# Patient Record
Sex: FEMALE | Race: WHITE | NOT HISPANIC OR LATINO | Employment: OTHER | URBAN - METROPOLITAN AREA
[De-identification: names, ages, dates, MRNs, and addresses within clinical notes are randomized per-mention and may not be internally consistent; named-entity substitution may affect disease eponyms.]

---

## 2017-09-26 ENCOUNTER — APPOINTMENT (OUTPATIENT)
Dept: PHYSICAL THERAPY | Facility: CLINIC | Age: 82
End: 2017-09-26
Payer: MEDICARE

## 2017-09-26 PROCEDURE — G8990 OTHER PT/OT CURRENT STATUS: HCPCS

## 2017-09-26 PROCEDURE — 97162 PT EVAL MOD COMPLEX 30 MIN: CPT

## 2017-09-26 PROCEDURE — G8991 OTHER PT/OT GOAL STATUS: HCPCS

## 2017-09-26 PROCEDURE — 97535 SELF CARE MNGMENT TRAINING: CPT

## 2017-09-27 ENCOUNTER — APPOINTMENT (OUTPATIENT)
Dept: PHYSICAL THERAPY | Facility: CLINIC | Age: 82
End: 2017-09-27
Payer: MEDICARE

## 2017-09-27 PROCEDURE — 97140 MANUAL THERAPY 1/> REGIONS: CPT

## 2017-09-27 PROCEDURE — 97110 THERAPEUTIC EXERCISES: CPT

## 2017-09-29 ENCOUNTER — APPOINTMENT (OUTPATIENT)
Dept: PHYSICAL THERAPY | Facility: CLINIC | Age: 82
End: 2017-09-29
Payer: MEDICARE

## 2017-09-29 PROCEDURE — 97140 MANUAL THERAPY 1/> REGIONS: CPT

## 2017-09-29 PROCEDURE — 97110 THERAPEUTIC EXERCISES: CPT

## 2017-10-02 ENCOUNTER — APPOINTMENT (OUTPATIENT)
Dept: PHYSICAL THERAPY | Facility: CLINIC | Age: 82
End: 2017-10-02
Payer: MEDICARE

## 2017-10-02 PROCEDURE — 97110 THERAPEUTIC EXERCISES: CPT

## 2017-10-02 PROCEDURE — 97140 MANUAL THERAPY 1/> REGIONS: CPT

## 2017-10-04 ENCOUNTER — APPOINTMENT (OUTPATIENT)
Dept: PHYSICAL THERAPY | Facility: CLINIC | Age: 82
End: 2017-10-04
Payer: MEDICARE

## 2017-10-04 PROCEDURE — 97110 THERAPEUTIC EXERCISES: CPT

## 2017-10-04 PROCEDURE — 97140 MANUAL THERAPY 1/> REGIONS: CPT

## 2017-10-06 ENCOUNTER — APPOINTMENT (OUTPATIENT)
Dept: PHYSICAL THERAPY | Facility: CLINIC | Age: 82
End: 2017-10-06
Payer: MEDICARE

## 2017-10-06 PROCEDURE — 97110 THERAPEUTIC EXERCISES: CPT

## 2017-10-06 PROCEDURE — 97140 MANUAL THERAPY 1/> REGIONS: CPT

## 2017-10-09 ENCOUNTER — APPOINTMENT (OUTPATIENT)
Dept: PHYSICAL THERAPY | Facility: CLINIC | Age: 82
End: 2017-10-09
Payer: MEDICARE

## 2017-10-09 PROCEDURE — 97110 THERAPEUTIC EXERCISES: CPT

## 2017-10-09 PROCEDURE — 97140 MANUAL THERAPY 1/> REGIONS: CPT

## 2017-10-11 ENCOUNTER — APPOINTMENT (OUTPATIENT)
Dept: PHYSICAL THERAPY | Facility: CLINIC | Age: 82
End: 2017-10-11
Payer: MEDICARE

## 2017-10-11 PROCEDURE — 97140 MANUAL THERAPY 1/> REGIONS: CPT

## 2017-10-11 PROCEDURE — 97110 THERAPEUTIC EXERCISES: CPT

## 2017-10-13 ENCOUNTER — APPOINTMENT (OUTPATIENT)
Dept: PHYSICAL THERAPY | Facility: CLINIC | Age: 82
End: 2017-10-13
Payer: MEDICARE

## 2017-10-13 PROCEDURE — 97140 MANUAL THERAPY 1/> REGIONS: CPT

## 2017-10-13 PROCEDURE — 97110 THERAPEUTIC EXERCISES: CPT

## 2017-10-16 ENCOUNTER — APPOINTMENT (OUTPATIENT)
Dept: PHYSICAL THERAPY | Facility: CLINIC | Age: 82
End: 2017-10-16
Payer: MEDICARE

## 2017-10-16 PROCEDURE — 97140 MANUAL THERAPY 1/> REGIONS: CPT

## 2017-10-16 PROCEDURE — G8991 OTHER PT/OT GOAL STATUS: HCPCS

## 2017-10-16 PROCEDURE — G8990 OTHER PT/OT CURRENT STATUS: HCPCS

## 2017-10-16 PROCEDURE — 97110 THERAPEUTIC EXERCISES: CPT

## 2017-10-16 PROCEDURE — 97164 PT RE-EVAL EST PLAN CARE: CPT

## 2017-10-18 ENCOUNTER — APPOINTMENT (OUTPATIENT)
Dept: PHYSICAL THERAPY | Facility: CLINIC | Age: 82
End: 2017-10-18
Payer: MEDICARE

## 2017-10-18 PROCEDURE — 97140 MANUAL THERAPY 1/> REGIONS: CPT

## 2017-10-18 PROCEDURE — 97150 GROUP THERAPEUTIC PROCEDURES: CPT

## 2017-10-20 ENCOUNTER — APPOINTMENT (OUTPATIENT)
Dept: PHYSICAL THERAPY | Facility: CLINIC | Age: 82
End: 2017-10-20
Payer: MEDICARE

## 2017-10-20 PROCEDURE — 97140 MANUAL THERAPY 1/> REGIONS: CPT

## 2017-10-20 PROCEDURE — 97150 GROUP THERAPEUTIC PROCEDURES: CPT

## 2017-10-23 ENCOUNTER — APPOINTMENT (OUTPATIENT)
Dept: PHYSICAL THERAPY | Facility: CLINIC | Age: 82
End: 2017-10-23
Payer: MEDICARE

## 2017-10-23 PROCEDURE — 97140 MANUAL THERAPY 1/> REGIONS: CPT

## 2017-10-23 PROCEDURE — 97150 GROUP THERAPEUTIC PROCEDURES: CPT

## 2017-10-25 ENCOUNTER — APPOINTMENT (OUTPATIENT)
Dept: PHYSICAL THERAPY | Facility: CLINIC | Age: 82
End: 2017-10-25
Payer: MEDICARE

## 2017-10-25 PROCEDURE — 97110 THERAPEUTIC EXERCISES: CPT

## 2017-10-25 PROCEDURE — 97140 MANUAL THERAPY 1/> REGIONS: CPT

## 2017-10-27 ENCOUNTER — APPOINTMENT (OUTPATIENT)
Dept: PHYSICAL THERAPY | Facility: CLINIC | Age: 82
End: 2017-10-27
Payer: MEDICARE

## 2017-10-27 PROCEDURE — 97140 MANUAL THERAPY 1/> REGIONS: CPT

## 2017-10-27 PROCEDURE — 97110 THERAPEUTIC EXERCISES: CPT

## 2017-10-30 ENCOUNTER — APPOINTMENT (OUTPATIENT)
Dept: PHYSICAL THERAPY | Facility: CLINIC | Age: 82
End: 2017-10-30
Payer: MEDICARE

## 2017-10-30 PROCEDURE — 97110 THERAPEUTIC EXERCISES: CPT

## 2017-10-30 PROCEDURE — 97140 MANUAL THERAPY 1/> REGIONS: CPT

## 2017-11-01 ENCOUNTER — APPOINTMENT (OUTPATIENT)
Dept: PHYSICAL THERAPY | Facility: CLINIC | Age: 82
End: 2017-11-01
Payer: MEDICARE

## 2017-11-01 PROCEDURE — 97150 GROUP THERAPEUTIC PROCEDURES: CPT

## 2017-11-01 PROCEDURE — 97140 MANUAL THERAPY 1/> REGIONS: CPT

## 2017-11-03 ENCOUNTER — APPOINTMENT (OUTPATIENT)
Dept: PHYSICAL THERAPY | Facility: CLINIC | Age: 82
End: 2017-11-03
Payer: MEDICARE

## 2017-11-03 PROCEDURE — 97140 MANUAL THERAPY 1/> REGIONS: CPT

## 2017-11-03 PROCEDURE — 97150 GROUP THERAPEUTIC PROCEDURES: CPT

## 2017-11-06 ENCOUNTER — APPOINTMENT (OUTPATIENT)
Dept: PHYSICAL THERAPY | Facility: CLINIC | Age: 82
End: 2017-11-06
Payer: MEDICARE

## 2017-11-06 PROCEDURE — 97110 THERAPEUTIC EXERCISES: CPT

## 2017-11-06 PROCEDURE — 97140 MANUAL THERAPY 1/> REGIONS: CPT

## 2017-11-08 ENCOUNTER — APPOINTMENT (OUTPATIENT)
Dept: PHYSICAL THERAPY | Facility: CLINIC | Age: 82
End: 2017-11-08
Payer: MEDICARE

## 2017-11-08 PROCEDURE — 97150 GROUP THERAPEUTIC PROCEDURES: CPT

## 2017-11-08 PROCEDURE — 97140 MANUAL THERAPY 1/> REGIONS: CPT

## 2017-11-13 ENCOUNTER — APPOINTMENT (OUTPATIENT)
Dept: PHYSICAL THERAPY | Facility: CLINIC | Age: 82
End: 2017-11-13
Payer: MEDICARE

## 2017-11-15 ENCOUNTER — APPOINTMENT (OUTPATIENT)
Dept: PHYSICAL THERAPY | Facility: CLINIC | Age: 82
End: 2017-11-15
Payer: MEDICARE

## 2017-11-15 PROCEDURE — 97164 PT RE-EVAL EST PLAN CARE: CPT

## 2017-11-15 PROCEDURE — 97140 MANUAL THERAPY 1/> REGIONS: CPT

## 2017-11-15 PROCEDURE — G8990 OTHER PT/OT CURRENT STATUS: HCPCS

## 2017-11-15 PROCEDURE — 97110 THERAPEUTIC EXERCISES: CPT

## 2017-11-15 PROCEDURE — G8991 OTHER PT/OT GOAL STATUS: HCPCS

## 2017-11-17 ENCOUNTER — APPOINTMENT (OUTPATIENT)
Dept: PHYSICAL THERAPY | Facility: CLINIC | Age: 82
End: 2017-11-17
Payer: MEDICARE

## 2018-08-28 ENCOUNTER — TRANSCRIBE ORDERS (OUTPATIENT)
Dept: PHYSICAL THERAPY | Facility: CLINIC | Age: 83
End: 2018-08-28

## 2018-08-28 ENCOUNTER — EVALUATION (OUTPATIENT)
Dept: OCCUPATIONAL THERAPY | Facility: CLINIC | Age: 83
End: 2018-08-28
Payer: MEDICARE

## 2018-08-28 DIAGNOSIS — S62.616D CLOSED DISPLACED FRACTURE OF PROXIMAL PHALANX OF RIGHT LITTLE FINGER WITH ROUTINE HEALING, SUBSEQUENT ENCOUNTER: Primary | ICD-10-CM

## 2018-08-28 PROCEDURE — 97166 OT EVAL MOD COMPLEX 45 MIN: CPT

## 2018-08-28 PROCEDURE — 97535 SELF CARE MNGMENT TRAINING: CPT

## 2018-08-28 PROCEDURE — 97110 THERAPEUTIC EXERCISES: CPT

## 2018-08-28 PROCEDURE — G8984 CARRY CURRENT STATUS: HCPCS

## 2018-08-28 PROCEDURE — 97140 MANUAL THERAPY 1/> REGIONS: CPT

## 2018-08-28 PROCEDURE — 97018 PARAFFIN BATH THERAPY: CPT

## 2018-08-28 PROCEDURE — G8985 CARRY GOAL STATUS: HCPCS

## 2018-08-28 NOTE — PROGRESS NOTES
OT Evaluation     Today's date: 2018  Patient name: Jojo Ellis  : 1935  MRN: [id number]  Referring provider: Racquel Walters MD  Dx:   Encounter Diagnosis     ICD-10-CM    1  Closed displaced fracture of proximal phalanx of right little finger with routine healing, subsequent encounter S62 616D            Assessment    Assessment details: Patient presenting to OP OT services with a closed nondisplaced fracture of proximal phalanx of right small finger  Patient reports experiencing a LOC with a fall on 2018  Patient was evaluated at [facility] the following day on 2018  X-Ray completed with fracture of right small finger noted  Patient also had X-Rays completed of head and neck  Patient also presenting with black and blueness of left elbow  Patient had pinning completed on 2018 by Dr Roddy Moritz to realign right small finger  Patient had pins removed on 2018  Patient has a follow-up on 2018  Patient presents with ulnar gutter splint placed since surgery  Understanding of Dx/Px/POC: good   Prognosis: good    Goals  STGs    Pt will increase  strength by 5-10#  Pt will increase digit ROM by 50%  Pt will demonstrate decrease in edema by 25%    Independent with HEP      LTGs     Pt will increase  strength by an additional 5-10#      Pt will increase digit ROM to WNL    Pt will demonstrate decrease in edema by 50%    Pt will report decrease in morning stiffness by 50%    Pt will report an increase in ADL/IADL participation          Plan  Patient would benefit from: OT eval and skilled occupational therapy  Planned modality interventions: unattended electrical stimulation, ultrasound, thermotherapy: paraffin bath, thermotherapy: hydrocollator packs and cryotherapy  Planned therapy interventions: joint mobilization, manual therapy, massage, patient education, strengthening, stretching, therapeutic activities, therapeutic exercise, fine motor coordination training and home exercise program  Frequency: 2x week  Duration in visits: 8  Duration in weeks: 4  Treatment plan discussed with: patient  Plan details: Patient has presenting to OP OT services with a dx of nondisplaced fracture of right small finger  Patient demonstrating increased pain, decreased strength, decreased ROM and decreased activity  Pt would benefit from continued Occupational Therapy services two times per week for 6+ weeks to return to prior level of function and achieve all established goals  Thank you for the referral!          Subjective Evaluation    History of Present Illness  Date of onset: 2018  Date of surgery: 2018  Mechanism of injury: S/p fall  Pain  Current pain ratin  At best pain ratin  At worst pain rating: 3  Location: R Small Finger    Hand dominance: right      Diagnostic Tests  X-ray: abnormal  Treatments  Previous treatment: immobilization  Current treatment: immobilization and occupational therapy  Patient Goals  Patient goals for therapy: decreased edema, decreased pain, increased motion, increased strength and independence with ADLs/IADLs          Objective     Neurological Testing     Sensation     Wrist/Hand   Left   Intact: light touch    Right   Intact: light touch    Additional Neurological Details  No sensation deficits noted    Active Range of Motion     Left Wrist   Normal active range of motion    Right Wrist   Normal active range of motion    Left Thumb   Opposition: WNL    Right Thumb   Opposition: WNL    Right Digits   Flexion   Index     MCP: 35    PIP: 65    DIP: 50  Extension   Little     MCP: 0    PIP: 50    DIP: 25    Additional Active Range of Motion Details  Patient presenting with soft composite fist of right hand with 1 5 cm in distance to distal palmar crease with right small finger       Strength/Myotome Testing     Left Wrist/Hand      (2nd hand position)     Trial 1: 30    Right Wrist/Hand   Wrist extension: 3+  Wrist flexion: 3+  Radial deviation: 3+  Ulnar deviation: 3+     (2nd hand position)     Trial 1: 10    Additional Strength Details  Decreased  strength noted    Swelling     Left Wrist/Hand   Little     Proximal: 5 75 cm    Middle: 5 25 cm    Distal: 4 75 cm    Right Wrist/Hand   Little     Proximal: 7 cm    Middle: 5 25 cm    Distal: 5 2 cm    Additional Swelling Details  Increased swelling noted at small finger of right hand as compared to left      Flowsheet Rows      Most Recent Value   PT/OT G-Codes   Current Score  27   Projected Score  59   FOTO information reviewed  Yes   Assessment Type  Evaluation   G code set  Carrying, Moving & Handling Objects   Carrying, Moving and Handling Objects Current Status ()  CL   Carrying, Moving and Handling Objects Goal Status ()  CK          Precautions NA    Specialty Daily Treatment Diary     Manual  08/28/2018       Composite Digit Stretch 30 sec x 6       Digit Extension Stretch 30 sec x 6       Edema Massage 10 Min                           Exercise Diary  08/28/2018       DIP Blocking X 30       PIP Blocking X 30       MCP Blocking X 30       TGE X 10       Theraputty Grasps        Theraputty Pinches        Theraputty Intrinsic Pushes        Finger Abd/adduction X 20       Single Digi-Flex        Theraputty Finger Abduction                                                                                            Modalities 08/28/2018       Paraffin w/ MH 10 Min                             Patient tolerated session well  Patient and therapist discussed exercises as per initial HEP  Patient verbalized understanding of education and demonstrated proper technique  Therapist will make increases as tolerated   Continue with POC

## 2018-08-28 NOTE — LETTER
2018    MD Rosalva Miller [phone]  [id number] [employer]    Patient: Vu Medina   YOB: 1935   Date of Visit: 2018     Encounter Diagnosis     ICD-10-CM    1  Closed displaced fracture of proximal phalanx of right little finger with routine healing, subsequent encounter S62 616D        Dear Dr Jonnathan Pavon:    Please review the attached Plan of Care from [facility] recent visit  Please verify that you agree therapy should continue by signing the attached document and sending it back to our office  If you have any questions or concerns, please don't hesitate to call  Sincerely,    Duncan Easley, OT      Referring Provider:     I certify that I have read the below Plan of Care and certify the need for these services furnished under this plan of treatment while under my care  Bret Frias MD  [address]  [address]  [facility] [place]  [id number]  [id number]  VIA Facsimile: [phone]        OT Evaluation     Today's date: 2018  Patient name: Vu Medina  : 1935  MRN: [id number]  Referring provider: Estevan Mg MD  Dx:   Encounter Diagnosis     ICD-10-CM    1  Closed displaced fracture of proximal phalanx of right little finger with routine healing, subsequent encounter S62 616D            Assessment    Assessment details: Patient presenting to OP OT services with a closed nondisplaced fracture of proximal phalanx of right small finger  Patient reports experiencing a LOC with a fall on 2018  Patient was evaluated at [facility] ER the following day on 2018  X-Ray completed with fracture of right small finger noted  Patient also had X-Rays completed of head and neck  Patient also presenting with black and blueness of left elbow  Patient had pinning completed on 2018 by Dr Jonnathan Pavon to realign right small finger  Patient had pins removed on 2018  Patient has a follow-up on 2018   Patient presents with ulnar gutter splint placed since surgery  Understanding of Dx/Px/POC: good   Prognosis: good    Goals  STGs    Pt will increase  strength by 5-10#  Pt will increase digit ROM by 50%  Pt will demonstrate decrease in edema by 25%    Independent with HEP      LTGs     Pt will increase  strength by an additional 5-10#  Pt will increase digit ROM to WNL    Pt will demonstrate decrease in edema by 50%    Pt will report decrease in morning stiffness by 50%    Pt will report an increase in ADL/IADL participation          Plan  Patient would benefit from: OT eval and skilled occupational therapy  Planned modality interventions: unattended electrical stimulation, ultrasound, thermotherapy: paraffin bath, thermotherapy: hydrocollator packs and cryotherapy  Planned therapy interventions: joint mobilization, manual therapy, massage, patient education, strengthening, stretching, therapeutic activities, therapeutic exercise, fine motor coordination training and home exercise program  Frequency: 2x week  Duration in visits: 8  Duration in weeks: 4  Treatment plan discussed with: patient  Plan details: Patient has presenting to OP OT services with a dx of nondisplaced fracture of right small finger  Patient demonstrating increased pain, decreased strength, decreased ROM and decreased activity  Pt would benefit from continued Occupational Therapy services two times per week for 6+ weeks to return to prior level of function and achieve all established goals   Thank you for the referral!          Subjective Evaluation    History of Present Illness  Date of onset: 2018  Date of surgery: 2018  Mechanism of injury: S/p fall  Pain  Current pain ratin  At best pain ratin  At worst pain rating: 3  Location: R Small Finger    Hand dominance: right      Diagnostic Tests  X-ray: abnormal  Treatments  Previous treatment: immobilization  Current treatment: immobilization and occupational therapy  Patient Goals  Patient goals for therapy: decreased edema, decreased pain, increased motion, increased strength and independence with ADLs/IADLs          Objective     Neurological Testing     Sensation     Wrist/Hand   Left   Intact: light touch    Right   Intact: light touch    Additional Neurological Details  No sensation deficits noted    Active Range of Motion     Left Wrist   Normal active range of motion    Right Wrist   Normal active range of motion    Left Thumb   Opposition: WNL    Right Thumb   Opposition: WNL    Right Digits   Flexion   Index     MCP: 35    PIP: 65    DIP: 50  Extension   Little     MCP: 0    PIP: 50    DIP: 25    Additional Active Range of Motion Details  Patient presenting with soft composite fist of right hand with 1 5 cm in distance to distal palmar crease with right small finger       Strength/Myotome Testing     Left Wrist/Hand      (2nd hand position)     Trial 1: 30    Right Wrist/Hand   Wrist extension: 3+  Wrist flexion: 3+  Radial deviation: 3+  Ulnar deviation: 3+     (2nd hand position)     Trial 1: 10    Additional Strength Details  Decreased  strength noted    Swelling     Left Wrist/Hand   Little     Proximal: 5 75 cm    Middle: 5 25 cm    Distal: 4 75 cm    Right Wrist/Hand   Little     Proximal: 7 cm    Middle: 5 25 cm    Distal: 5 2 cm    Additional Swelling Details  Increased swelling noted at small finger of right hand as compared to left      Flowsheet Rows      Most Recent Value   PT/OT G-Codes   Current Score  27   Projected Score  59   FOTO information reviewed  Yes   Assessment Type  Evaluation   G code set  Carrying, Moving & Handling Objects   Carrying, Moving and Handling Objects Current Status ()  CL   Carrying, Moving and Handling Objects Goal Status ()  CK          Precautions NA    Specialty Daily Treatment Diary     Manual  08/28/2018       Composite Digit Stretch 30 sec x 6       Digit Extension Stretch 30 sec x 6 Edema Massage 10 Min                           Exercise Diary  08/28/2018       DIP Blocking X 30       PIP Blocking X 30       MCP Blocking X 30       TGE X 10       Theraputty Grasps        Theraputty Pinches        Theraputty Intrinsic Pushes        Finger Abd/adduction X 20       Single Digi-Flex        Theraputty Finger Abduction                                                                                            Modalities 08/28/2018       Paraffin w/ MH 10 Min                             Patient tolerated session well  Patient and therapist discussed exercises as per initial HEP  Patient verbalized understanding of education and demonstrated proper technique  Therapist will make increases as tolerated   Continue with POC

## 2018-08-30 ENCOUNTER — OFFICE VISIT (OUTPATIENT)
Dept: OCCUPATIONAL THERAPY | Facility: CLINIC | Age: 83
End: 2018-08-30
Payer: MEDICARE

## 2018-08-30 DIAGNOSIS — S62.616D CLOSED DISPLACED FRACTURE OF PROXIMAL PHALANX OF RIGHT LITTLE FINGER WITH ROUTINE HEALING, SUBSEQUENT ENCOUNTER: Primary | ICD-10-CM

## 2018-08-30 PROCEDURE — 97140 MANUAL THERAPY 1/> REGIONS: CPT

## 2018-08-30 PROCEDURE — 97018 PARAFFIN BATH THERAPY: CPT

## 2018-08-30 PROCEDURE — 97110 THERAPEUTIC EXERCISES: CPT

## 2018-08-30 NOTE — PROGRESS NOTES
Daily Note     Today's date: 2018  Patient name: Roselyn Quintero  : 1935  MRN: [id number]  Referring provider: Gonzalo Orozco MD  Dx:   Encounter Diagnosis     ICD-10-CM    1  Closed displaced fracture of proximal phalanx of right little finger with routine healing, subsequent encounter S62 616D        Subjective: "They said I needed 30 pounds on my right to drive "      Objective: See treatment diary below    Specialty Daily Treatment Diary     Manual  2018      Composite Digit Stretch 30 sec x 6 30 sec x 6      Digit Extension Stretch 30 sec x 6 30 sec x 6      Edema Massage 10 Min 10 Min                          Exercise Diary  2018      DIP Blocking X 30 X 30      PIP Blocking X 30 X 30      MCP Blocking X 30 X 30      TGE X 10 X 10      Theraputty Grasps  Yellow x 20      Digi-Flex  Red x 20      Theraputty Intrinsic Pushes  Yellow x 20      Finger Abd/adduction X 20 X 20      Single Digi-Flex        Theraputty Finger Abduction                                                                                            Modalities 2018      Paraffin w/ MH 10 Min 10 Min                            Assessment: Tolerated treatment well  Patient exhibited good technique with therapeutic exercises and would benefit from continued OT      Plan: Continue per plan of care  Progress treatment as tolerated

## 2018-09-04 ENCOUNTER — OFFICE VISIT (OUTPATIENT)
Dept: OCCUPATIONAL THERAPY | Facility: CLINIC | Age: 83
End: 2018-09-04
Payer: MEDICARE

## 2018-09-04 DIAGNOSIS — S62.616D CLOSED DISPLACED FRACTURE OF PROXIMAL PHALANX OF RIGHT LITTLE FINGER WITH ROUTINE HEALING, SUBSEQUENT ENCOUNTER: Primary | ICD-10-CM

## 2018-09-04 PROCEDURE — 97110 THERAPEUTIC EXERCISES: CPT

## 2018-09-04 PROCEDURE — 97018 PARAFFIN BATH THERAPY: CPT

## 2018-09-04 PROCEDURE — 97140 MANUAL THERAPY 1/> REGIONS: CPT

## 2018-09-04 NOTE — PROGRESS NOTES
Daily Note     Today's date: 2018  Patient name: Feliberto Nice  : 1935  MRN: [id number]  Referring provider: Gordo Joseph MD  Dx:   Encounter Diagnosis     ICD-10-CM    1  Closed displaced fracture of proximal phalanx of right little finger with routine healing, subsequent encounter S62 616D        Subjective: "I have been doing my exercises "      Objective: See treatment diary below    Specialty Daily Treatment Diary     Manual  2018     Composite Digit Stretch 30 sec x 6 30 sec x 6 30 sec x 6     Digit Extension Stretch 30 sec x 6 30 sec x 6 30 sec x 6     Edema Massage 10 Min 10 Min 10 Min                         Exercise Diary  2018     DIP Blocking X 30 X 30 X 30     PIP Blocking X 30 X 30 X 30     MCP Blocking X 30 X 30 X 30     TGE X 10 X 10 X 10     Theraputty Grasps  Yellow x 20 Yellow x 20     Digi-Flex  Red x 20 Red x 20     Theraputty Intrinsic Pushes  Yellow x 20 Yellow x 20     Finger Abd/adduction X 20 X 20 X 20     Single Digi-Flex        Theraputty Finger Abduction                                                                                            Modalities 2018     Paraffin w/ MH 10 Min 10 Min 10 Min                           Assessment: Tolerated treatment well  Patient exhibited good technique with therapeutic exercises and would benefit from continued OT      Plan: Continue per plan of care  Progress treatment as tolerated

## 2018-09-06 ENCOUNTER — APPOINTMENT (OUTPATIENT)
Dept: OCCUPATIONAL THERAPY | Facility: CLINIC | Age: 83
End: 2018-09-06
Payer: MEDICARE

## 2018-09-07 ENCOUNTER — OFFICE VISIT (OUTPATIENT)
Dept: OCCUPATIONAL THERAPY | Facility: CLINIC | Age: 83
End: 2018-09-07
Payer: MEDICARE

## 2018-09-07 DIAGNOSIS — S62.616D CLOSED DISPLACED FRACTURE OF PROXIMAL PHALANX OF RIGHT LITTLE FINGER WITH ROUTINE HEALING, SUBSEQUENT ENCOUNTER: Primary | ICD-10-CM

## 2018-09-07 PROCEDURE — 97140 MANUAL THERAPY 1/> REGIONS: CPT

## 2018-09-07 PROCEDURE — L3906 WHO W/O JOINTS CF: HCPCS

## 2018-09-07 PROCEDURE — 97150 GROUP THERAPEUTIC PROCEDURES: CPT

## 2018-09-07 PROCEDURE — 97110 THERAPEUTIC EXERCISES: CPT

## 2018-09-07 NOTE — PROGRESS NOTES
Daily Note     Today's date: 2018  Patient name: Feliberto Nice  : 1935  MRN: [id number]  Referring provider: Gordo Joseph MD  Dx:   Encounter Diagnosis     ICD-10-CM    1  Closed displaced fracture of proximal phalanx of right little finger with routine healing, subsequent encounter S62 616D          Subjective: "It went down "      Objective: See treatment diary below    Specialty Daily Treatment Diary     Manual  2018    Composite Digit Stretch 30 sec x 6 30 sec x 6 30 sec x 6 30 sec x 6    Digit Extension Stretch 30 sec x 6 30 sec x 6 30 sec x 6 30 sec x 6    Edema Massage 10 Min 10 Min 10 Min 10 Min                        Exercise Diary  2018    DIP Blocking X 30 X 30 X 30 X 30    PIP Blocking X 30 X 30 X 30 X 30    MCP Blocking X 30 X 30 X 30 X 30    TGE X 10 X 10 X 10 X 10    Theraputty Grasps  Yellow x 20 Yellow x 20 Yellow x 20    Digi-Flex  Red x 20 Red x 20 Red x 20    Theraputty Intrinsic Pushes  Yellow x 20 Yellow x 20 Yellow x 20    Finger Abd/adduction X 20 X 20 X 20 X 20    Single Digi-Flex        Theraputty Finger Abduction        Theraputty Cone Presses    Yellow x 20                                                                                Modalities 2018    Paraffin w/ MH 10 Min 10 Min 10 Min 10 Min            Splint Fabrication    Static Digit Extension Splint          Assessment: Tolerated treatment well  Patient exhibited good technique with therapeutic exercises and would benefit from continued OT  Therapist fabricated Static Digit Extension Splint to be worn to increase digit extension  Patient verbalized good fit without complaints  Patient educated on rationale, splint wear schedule, splint care and donning/doffing  Patient verbalized understanding of education  Patient instructed to contact clinic if issues arise regarding splint   Therapist answered all questions and concerns regarding splint this session  Plan: Continue per plan of care  Progress treatment as tolerated

## 2018-09-11 ENCOUNTER — OFFICE VISIT (OUTPATIENT)
Dept: OCCUPATIONAL THERAPY | Facility: CLINIC | Age: 83
End: 2018-09-11
Payer: MEDICARE

## 2018-09-11 DIAGNOSIS — S62.616D CLOSED DISPLACED FRACTURE OF PROXIMAL PHALANX OF RIGHT LITTLE FINGER WITH ROUTINE HEALING, SUBSEQUENT ENCOUNTER: Primary | ICD-10-CM

## 2018-09-11 PROCEDURE — 97140 MANUAL THERAPY 1/> REGIONS: CPT

## 2018-09-11 PROCEDURE — 97018 PARAFFIN BATH THERAPY: CPT

## 2018-09-11 PROCEDURE — 97110 THERAPEUTIC EXERCISES: CPT

## 2018-09-11 NOTE — PROGRESS NOTES
Daily Note     Today's date: 2018  Patient name: Jorge Lynn  : 1935  MRN: [id number]  Referring provider: Darnell Wu MD  Dx:   Encounter Diagnosis     ICD-10-CM    1  Closed displaced fracture of proximal phalanx of right little finger with routine healing, subsequent encounter S62 616D          Subjective: "I go back tomorrow "      Objective: See treatment diary below    Specialty Daily Treatment Diary     Manual  2018   Composite Digit Stretch 30 sec x 6 30 sec x 6 30 sec x 6 30 sec x 6 30 sec x 6   Digit Extension Stretch 30 sec x 6 30 sec x 6 30 sec x 6 30 sec x 6 30 sec x 6   Edema Massage 10 Min 10 Min 10 Min 10 Min 10 [place]                       Exercise Diary  2018   DIP Blocking X 30 X 30 X 30 X 30 X 30   PIP Blocking X 30 X 30 X 30 X 30 X 30   MCP Blocking X 30 X 30 X 30 X 30 X 30   TGE X 10 X 10 X 10 X 10 X 10   Theraputty Grasps  Yellow x 20 Yellow x 20 Yellow x 20 Yellow x 20   Digi-Flex  Red x 20 Red x 20 Red x 20 Red x 20   Theraputty Intrinsic Pushes  Yellow x 20 Yellow x 20 Yellow x 20 Yellow x 20   Finger Abd/adduction X 20 X 20 X 20 X 20 X 20   Single Digi-Flex        Theraputty Finger Abduction        Theraputty Cone Presses    Yellow x 20 Yellow x 20   Golf Ball Manipulation     X 10                                                                       Modalities 2018   Paraffin w/ MH 10 Min 10 Min 10 Min 10 Min 10 Min           Splint Fabrication    Static Digit Extension Splint          Assessment: Tolerated treatment well  Patient exhibited good technique with therapeutic exercises and would benefit from continued OT      Plan: Continue per plan of care  Progress treatment as tolerated

## 2018-09-13 ENCOUNTER — OFFICE VISIT (OUTPATIENT)
Dept: OCCUPATIONAL THERAPY | Facility: CLINIC | Age: 83
End: 2018-09-13
Payer: MEDICARE

## 2018-09-13 DIAGNOSIS — S62.616D CLOSED DISPLACED FRACTURE OF PROXIMAL PHALANX OF RIGHT LITTLE FINGER WITH ROUTINE HEALING, SUBSEQUENT ENCOUNTER: Primary | ICD-10-CM

## 2018-09-13 PROCEDURE — 97140 MANUAL THERAPY 1/> REGIONS: CPT

## 2018-09-13 PROCEDURE — 97110 THERAPEUTIC EXERCISES: CPT

## 2018-09-13 NOTE — PROGRESS NOTES
Daily Note     Today's date: 2018  Patient name: Frida Pierre  : 1935  MRN: [id number]  Referring provider: Tyler Hernandez MD  Dx:   Encounter Diagnosis     ICD-10-CM    1  Closed displaced fracture of proximal phalanx of right little finger with routine healing, subsequent encounter S62 616D        Subjective: "They said it looks good "      Objective: See treatment diary below    Specialty Daily Treatment Diary     Manual  2018   Composite Digit Stretch 30 sec x 6  30 sec x 6 30 sec x 6 30 sec x 6   Digit Extension Stretch 30 sec x 6  30 sec x 6 30 sec x 6 30 sec x 6   Edema Massage 10 Min  10 Min 10 Min 10 Min                       Exercise Diary  2018   DIP Blocking X 30  X 30 X 30 X 30   PIP Blocking X 30  X 30 X 30 X 30   MCP Blocking X 30  X 30 X 30 X 30   TGE X 10  X 10 X 10 X 10   Theraputty Grasps Yellow x 20  Yellow x 20 Yellow x 20 Yellow x 20   Digi-Flex Red x 20  Red x 20 Red x 20 Red x 20   Theraputty Intrinsic Pushes Yellow x 20  Yellow x 20 Yellow x 20 Yellow x 20   Finger Abd/adduction X 20  X 20 X 20 X 20   Single Digi-Flex        Theraputty Finger Abduction        Theraputty Cone Presses Yellow x 20   Yellow x 20 Yellow x 20   Golf Ball Manipulation X 10    X 10                                                                       Modalities 2018   Paraffin w/ MH   10 Min 10 Min 10 Min   CP w/ Coban 10 Min       Splint Fabrication    Static Digit Extension Splint            Assessment: Tolerated treatment well  Patient exhibited good technique with therapeutic exercises and would benefit from continued OT  Therapist will fabricate digit extension splint next session vs Silver Finger Splint  Patient Kinesio Tapped right small finger with tension along dorsal aspect to promote digit extension  Plan: Continue per plan of care    Progress treatment as tolerated

## 2018-09-18 ENCOUNTER — OFFICE VISIT (OUTPATIENT)
Dept: OCCUPATIONAL THERAPY | Facility: CLINIC | Age: 83
End: 2018-09-18
Payer: MEDICARE

## 2018-09-18 DIAGNOSIS — S62.616D CLOSED DISPLACED FRACTURE OF PROXIMAL PHALANX OF RIGHT LITTLE FINGER WITH ROUTINE HEALING, SUBSEQUENT ENCOUNTER: Primary | ICD-10-CM

## 2018-09-18 PROCEDURE — 97140 MANUAL THERAPY 1/> REGIONS: CPT

## 2018-09-18 PROCEDURE — 97110 THERAPEUTIC EXERCISES: CPT

## 2018-09-18 NOTE — PROGRESS NOTES
Daily Note     Today's date: 2018  Patient name: Ronnell Thomas  : 1935  MRN: [id number]  Referring provider: Jaswinder Hsu MD  Dx:   Encounter Diagnosis     ICD-10-CM    1  Closed displaced fracture of proximal phalanx of right little finger with routine healing, subsequent encounter S62 616D        Subjective: "I worked out today "      Objective: See treatment diary below    Specialty Daily Treatment Diary     Manual  2018   Composite Digit Stretch 30 sec x 6 30 sec x 6  30 sec x 6 30 sec x 6   Digit Extension Stretch 30 sec x 6 30 sec x 6  30 sec x 6 30 sec x 6   Edema Massage 10 Min 10 Min  10 Min 10 Min                       Exercise Diary  2018   DIP Blocking X 30 X 30  X 30 X 30   PIP Blocking X 30 X 30  X 30 X 30   MCP Blocking X 30 X 30  X 30 X 30   TGE X 10 X 10  X 10 X 10   Theraputty Grasps Yellow x 20 Yellow x 20  Yellow x 20 Yellow x 20   Digi-Flex Red x 20 Red x 20  Red x 20 Red x 20   Theraputty Intrinsic Pushes Yellow x 20 Yellow x 20  Yellow x 20 Yellow x 20   Finger Abd/adduction X 20 X 20  X 20 X 20   Single Digi-Flex        Theraputty Finger Abduction  Yellow x 20      Theraputty Cone Presses Yellow x 20 Yellow x 20  Yellow x 20 Yellow x 20   Golf Ball Manipulation X 10 X 10   X 10                                                                       Modalities 2018   Paraffin w/ MH    10 Min 10 Min   CP w/ Coban 10 Min 10 Min      Splint Fabrication  Oval-8 Finger Extension Splint  Static Digit Extension Splint          Assessment: Tolerated treatment well  Patient exhibited good technique with therapeutic exercises and would benefit from continued OT      Plan: Continue per plan of care  Progress treatment as tolerated

## 2018-09-20 ENCOUNTER — OFFICE VISIT (OUTPATIENT)
Dept: OCCUPATIONAL THERAPY | Facility: CLINIC | Age: 83
End: 2018-09-20
Payer: MEDICARE

## 2018-09-20 DIAGNOSIS — S62.616D CLOSED DISPLACED FRACTURE OF PROXIMAL PHALANX OF RIGHT LITTLE FINGER WITH ROUTINE HEALING, SUBSEQUENT ENCOUNTER: Primary | ICD-10-CM

## 2018-09-20 PROCEDURE — 97110 THERAPEUTIC EXERCISES: CPT

## 2018-09-20 PROCEDURE — 97140 MANUAL THERAPY 1/> REGIONS: CPT

## 2018-09-20 NOTE — PROGRESS NOTES
Daily Note     Today's date: 2018  Patient name: Elvia Khanna  : 1935  MRN: [id number]  Referring provider: Basim Najera MD  Dx:   Encounter Diagnosis     ICD-10-CM    1  Closed displaced fracture of proximal phalanx of right little finger with routine healing, subsequent encounter S62 616D        Subjective: "It looks a lot straighter "      Objective: See treatment diary below      Specialty Daily Treatment Diary     Manual  2018   Composite Digit Stretch 30 sec x 6 30 sec x 6 30 sec x 6  30 sec x 6   Digit Extension Stretch 30 sec x 6 30 sec x 6 30 sec x 6  30 sec x 6   Edema Massage 10 Min 10 Min 10 Min  10 Min                       Exercise Diary  2018   DIP Blocking X 30 X 30 X 30  X 30   PIP Blocking X 30 X 30 X 30  X 30   MCP Blocking X 30 X 30 X 30  X 30   TGE X 10 X 10 X 10  X 10   Theraputty Grasps Yellow x 20 Yellow x 20 Red x 20  Yellow x 20   Digi-Flex Red x 20 Red x 20 Green x 20  Red x 20   Theraputty Intrinsic Pushes Yellow x 20 Yellow x 20 Red x 20  Yellow x 20   Finger Abd/adduction X 20 X 20 X 20  X 20   Single Digi-Flex        Theraputty Finger Abduction  Yellow x 20 Red x 20     Theraputty Cone Presses Yellow x 20 Yellow x 20 Red x 20  Yellow x 20   Golf Ball Manipulation X 10 X 10 X 10  X 10   Clothespin Pinches   X 10                                                                 Modalities 2018   Paraffin w/ MH     10 Min   CP w/ Coban 10 Min 10 Min 10 Min     Splint Fabrication  Oval-8 Finger Extension Splint            Assessment: Tolerated treatment well  Patient exhibited good technique with therapeutic exercises and would benefit from continued OT  Patient demonstrating with increased digit extension as compared to previous session  Plan: Continue per plan of care  Progress treatment as tolerated

## 2018-09-25 ENCOUNTER — TRANSCRIBE ORDERS (OUTPATIENT)
Dept: PHYSICAL THERAPY | Facility: CLINIC | Age: 83
End: 2018-09-25

## 2018-09-25 ENCOUNTER — OFFICE VISIT (OUTPATIENT)
Dept: OCCUPATIONAL THERAPY | Facility: CLINIC | Age: 83
End: 2018-09-25
Payer: MEDICARE

## 2018-09-25 DIAGNOSIS — S62.616D CLOSED DISPLACED FRACTURE OF PROXIMAL PHALANX OF RIGHT LITTLE FINGER WITH ROUTINE HEALING, SUBSEQUENT ENCOUNTER: Primary | ICD-10-CM

## 2018-09-25 PROCEDURE — 97168 OT RE-EVAL EST PLAN CARE: CPT

## 2018-09-25 PROCEDURE — G8985 CARRY GOAL STATUS: HCPCS

## 2018-09-25 PROCEDURE — 97140 MANUAL THERAPY 1/> REGIONS: CPT

## 2018-09-25 PROCEDURE — 97110 THERAPEUTIC EXERCISES: CPT

## 2018-09-25 PROCEDURE — G8984 CARRY CURRENT STATUS: HCPCS

## 2018-09-25 NOTE — LETTER
2018    MD Rosalva Alleniredo [phone]    Patient: Flaco Arriaga   YOB: 1935   Date of Visit: 2018     Encounter Diagnosis     ICD-10-CM    1  Closed displaced fracture of proximal phalanx of right little finger with routine healing, subsequent encounter S62 616D        Dear Dr Stefan Corona:    Please review the attached Plan of Care from [facility] recent visit  Please verify that you agree therapy should continue by signing the attached document and sending it back to our office  If you have any questions or concerns, please don't hesitate to call  Sincerely,    Darcy Landa OT      Referring Provider:     I certify that I have read the below Plan of Care and certify the need for these services furnished under this plan of treatment while under my care  Pamela Landa MD  [address] [facility] [address]: [phone]        OT Re-Evaluation    Today's date: 2018  Patient name: Flaco Arriaga  : 1935  MRN: [id number]  Referring provider: Fabi Walters MD  Dx:   Encounter Diagnosis     ICD-10-CM    1  Closed displaced fracture of proximal phalanx of right little finger with routine healing, subsequent encounter S62 616D        Assessment    Assessment details: Patient presenting to OP OT services with a closed nondisplaced fracture of proximal phalanx of right small finger  Patient reports experiencing a LOC with a fall on 2018  Patient was evaluated at [facility] ER the following day on 2018  X-Ray completed with fracture of right small finger noted  Patient also had X-Rays completed of head and neck  Patient also presenting with black and blueness of left elbow  Patient had pinning completed on 2018 by Dr Stefan Corona to realign right small finger  Patient had pins removed on 2018  Patient has a follow-up on 2018   Patient presents with ulnar gutter splint placed since surgery  Patient has consistently attended OP OT services since start of care  Patient reports increased ROM, strength and functional use since start of care  Patient reports completing yard over the weekend and experienced increased swelling and pain  Patient reports she does not know the etiology of new onset of symptoms  Understanding of Dx/Px/POC: good   Prognosis: good    Goals  STGs    Pt will increase  strength by 5-10#  - Met    Pt will increase digit ROM by 50%  - Met    Pt will demonstrate decrease in edema by 25% - Met    Independent with HEP - Met      LTGs     Pt will increase  strength by an additional 5-10#  - Met    Pt will increase digit ROM to WNL - Partially Met    Pt will demonstrate decrease in edema by 50% - Partially Met    Pt will report decrease in morning stiffness by 50% - Partially Met    Pt will report an increase in ADL/IADL participation - Partially Met          Plan  Patient would benefit from: skilled occupational therapy  Planned modality interventions: unattended electrical stimulation, ultrasound, thermotherapy: paraffin bath, thermotherapy: hydrocollator packs and cryotherapy  Planned therapy interventions: joint mobilization, manual therapy, massage, patient education, strengthening, stretching, therapeutic activities, therapeutic exercise, fine motor coordination training and home exercise program  Frequency: 2x week  Duration in visits: 8  Duration in weeks: 4  Treatment plan discussed with: patient  Plan details: Patient has Consistent attended OP OT services since start of care  Patient has made steady progress towards established goals  Pt has shown improvement start of care, demonstrating decreased pain, increased strength, increased ROM and increased activity  However, pt remains with impairments including remaining ROM and strength deficits   Pt would benefit from continuation of skilled therapy services to further progress POC and address remaining deficits at this time  Thank you! Subjective Evaluation    History of Present Illness  Date of onset: 2018  Date of surgery: 2018  Mechanism of injury: S/p fall  Pain  Current pain ratin  At best pain ratin  At worst pain ratin  Location: R Small Finger    Hand dominance: right      Diagnostic Tests  X-ray: abnormal  Treatments  Previous treatment: immobilization  Current treatment: immobilization and occupational therapy  Patient Goals  Patient goals for therapy: decreased edema, decreased pain, increased motion, increased strength and independence with ADLs/IADLs          Objective     Neurological Testing     Sensation     Wrist/Hand   Left   Intact: light touch    Right   Intact: light touch    Additional Neurological Details  No sensation deficits noted    Active Range of Motion     Left Wrist   Normal active range of motion    Right Wrist   Normal active range of motion    Left Thumb   Opposition: WNL    Right Thumb   Opposition: WNL    Right Digits   Flexion   Little     MCP: 35    PIP: 70    DIP: 75  Extension   Little     MCP: 0    PIP: 45    DIP: 25    Additional Active Range of Motion Details  Patient presenting with soft composite fist of right hand with 1 5 cm in distance to distal palmar crease with right small finger  Strength/Myotome Testing     Left Wrist/Hand      (2nd hand position)     Trial 1: 40    Right Wrist/Hand   Wrist extension: 4-  Wrist flexion: 4-  Radial deviation: 4-  Ulnar deviation: 4-     (2nd hand position)     Trial 1: 35    Additional Strength Details  Increased  strength noted    Swelling     Left Wrist/Hand   Little     Proximal: 5 75 cm    Middle: 5 25 cm    Distal: 4 75 cm    Right Wrist/Hand   Little     Proximal: 6 75 cm    Middle: 6 5 cm    Distal: 5 25 cm    Additional Swelling Details  Increased swelling noted at small finger of right hand as compared to left     Patient reports that she completed yard work over the weekend and experienced increased swelling at the small finger of left hand  Specialty Daily Treatment Diary     Manual  09/13/2018 09/18/2018 09/20/2018 09/25/2018    Composite Digit Stretch 30 sec x 6 30 sec x 6 30 sec x 6 30 sec x 6    Digit Extension Stretch 30 sec x 6 30 sec x 6 30 sec x 6 30 sec x 6    Edema Massage 10 Min 10 Min 10 Min 10 Min                        Exercise Diary  09/13/2018 09/18/2018 09/20/2018 09/25/2018    DIP Blocking X 30 X 30 X 30 X 30    PIP Blocking X 30 X 30 X 30 X 30    MCP Blocking X 30 X 30 X 30 X 30    TGE X 10 X 10 X 10 X 10    Theraputty Grasps Yellow x 20 Yellow x 20 Red x 20     Digi-Flex Red x 20 Red x 20 Green x 20     Theraputty Intrinsic Pushes Yellow x 20 Yellow x 20 Red x 20     Finger Abd/adduction X 20 X 20 X 20 X 20    Single Digi-Flex        Theraputty Finger Abduction  Yellow x 20 Red x 20     Theraputty Cone Presses Yellow x 20 Yellow x 20 Red x 20     Golf Ball Manipulation X 10 X 10 X 10     Clothespin Pinches   X 10                                                                 Modalities 09/13/2018 09/18/2018 09/20/2018 09/25/2018    Paraffin w/ MH        CP w/ Coban 10 Min 10 Min 10 Min 10 Min    Splint Fabrication  Oval-8 Finger Extension Splint          Patient unable to tolerate all exercises this session secondary to increased swelling and pain from gardening over the weekend  Patient instructed to complete exercises as able with rest breaks

## 2018-09-25 NOTE — PROGRESS NOTES
OT Re-Evaluation    Today's date: 2018  Patient name: Misty Campbell  : 1935  MRN: [id number]  Referring provider: Pamela Emmanuel MD  Dx:   Encounter Diagnosis     ICD-10-CM    1  Closed displaced fracture of proximal phalanx of right little finger with routine healing, subsequent encounter S62 616D        Assessment    Assessment details: Patient presenting to OP OT services with a closed nondisplaced fracture of proximal phalanx of right small finger  Patient reports experiencing a LOC with a fall on 2018  Patient was evaluated at [facility] ER the following day on 2018  X-Ray completed with fracture of right small finger noted  Patient also had X-Rays completed of head and neck  Patient also presenting with black and blueness of left elbow  Patient had pinning completed on 2018 by Dr Sandhya Dawn to realign right small finger  Patient had pins removed on 2018  Patient has a follow-up on 2018  Patient presents with ulnar gutter splint placed since surgery  Patient has consistently attended OP OT services since start of care  Patient reports increased ROM, strength and functional use since start of care  Patient reports completing yard over the weekend and experienced increased swelling and pain  Patient reports she does not know the etiology of new onset of symptoms  Understanding of Dx/Px/POC: good   Prognosis: good    Goals  STGs    Pt will increase  strength by 5-10#  - Met    Pt will increase digit ROM by 50%   - Met    Pt will demonstrate decrease in edema by 25% - Met    Independent with HEP - Met      LTGs     Pt will increase  strength by an additional 5-10#  - Met    Pt will increase digit ROM to WNL - Partially Met    Pt will demonstrate decrease in edema by 50% - Partially Met    Pt will report decrease in morning stiffness by 50% - Partially Met    Pt will report an increase in ADL/IADL participation - Partially Met          Plan  Patient would benefit from: skilled occupational therapy  Planned modality interventions: unattended electrical stimulation, ultrasound, thermotherapy: paraffin bath, thermotherapy: hydrocollator packs and cryotherapy  Planned therapy interventions: joint mobilization, manual therapy, massage, patient education, strengthening, stretching, therapeutic activities, therapeutic exercise, fine motor coordination training and home exercise program  Frequency: 2x week  Duration in visits: 8  Duration in weeks: 4  Treatment plan discussed with: patient  Plan details: Patient has Consistent attended OP OT services since start of care  Patient has made steady progress towards established goals  Pt has shown improvement start of care, demonstrating decreased pain, increased strength, increased ROM and increased activity  However, pt remains with impairments including remaining ROM and strength deficits  Pt would benefit from continuation of skilled therapy services to further progress POC and address remaining deficits at this time  Thank you!             Subjective Evaluation    History of Present Illness  Date of onset: 2018  Date of surgery: 2018  Mechanism of injury: S/p fall  Pain  Current pain ratin  At best pain ratin  At worst pain ratin  Location: R Small Finger    Hand dominance: right      Diagnostic Tests  X-ray: abnormal  Treatments  Previous treatment: immobilization  Current treatment: immobilization and occupational therapy  Patient Goals  Patient goals for therapy: decreased edema, decreased pain, increased motion, increased strength and independence with ADLs/IADLs          Objective     Neurological Testing     Sensation     Wrist/Hand   Left   Intact: light touch    Right   Intact: light touch    Additional Neurological Details  No sensation deficits noted    Active Range of Motion     Left Wrist   Normal active range of motion    Right Wrist   Normal active range of motion    Left Thumb Opposition: WNL    Right Thumb   Opposition: WNL    Right Digits   Flexion   Little     MCP: 35    PIP: 70    DIP: 75  Extension   Little     MCP: 0    PIP: 45    DIP: 25    Additional Active Range of Motion Details  Patient presenting with soft composite fist of right hand with 1 5 cm in distance to distal palmar crease with right small finger  Strength/Myotome Testing     Left Wrist/Hand      (2nd hand position)     Trial 1: 40    Right Wrist/Hand   Wrist extension: 4-  Wrist flexion: 4-  Radial deviation: 4-  Ulnar deviation: 4-     (2nd hand position)     Trial 1: 35    Additional Strength Details  Increased  strength noted    Swelling     Left Wrist/Hand   Little     Proximal: 5 75 cm    Middle: 5 25 cm    Distal: 4 75 cm    Right Wrist/Hand   Little     Proximal: 6 75 cm    Middle: 6 5 cm    Distal: 5 25 cm    Additional Swelling Details  Increased swelling noted at small finger of right hand as compared to left  Patient reports that she completed yard work over the weekend and experienced increased swelling at the small finger of left hand               Specialty Daily Treatment Diary     Manual  09/13/2018 09/18/2018 09/20/2018 09/25/2018    Composite Digit Stretch 30 sec x 6 30 sec x 6 30 sec x 6 30 sec x 6    Digit Extension Stretch 30 sec x 6 30 sec x 6 30 sec x 6 30 sec x 6    Edema Massage 10 Min 10 Min 10 Min 10 Min                        Exercise Diary  09/13/2018 09/18/2018 09/20/2018 09/25/2018    DIP Blocking X 30 X 30 X 30 X 30    PIP Blocking X 30 X 30 X 30 X 30    MCP Blocking X 30 X 30 X 30 X 30    TGE X 10 X 10 X 10 X 10    Theraputty Grasps Yellow x 20 Yellow x 20 Red x 20     Digi-Flex Red x 20 Red x 20 Green x 20     Theraputty Intrinsic Pushes Yellow x 20 Yellow x 20 Red x 20     Finger Abd/adduction X 20 X 20 X 20 X 20    Single Digi-Flex        Theraputty Finger Abduction  Yellow x 20 Red x 20     Theraputty Cone Presses Yellow x 20 Yellow x 20 Red x 20     [facility] Manipulation X 10 X 10 X 10     Clothespin Pinches   X 10                                                                 Modalities 09/13/2018 09/18/2018 09/20/2018 09/25/2018    Paraffin w/ MH        CP w/ Coban 10 Min 10 Min 10 Min 10 Min    Splint Fabrication  Oval-8 Finger Extension Splint          Patient unable to tolerate all exercises this session secondary to increased swelling and pain from gardening over the weekend  Patient instructed to complete exercises as able with rest breaks

## 2018-09-27 ENCOUNTER — OFFICE VISIT (OUTPATIENT)
Dept: OCCUPATIONAL THERAPY | Facility: CLINIC | Age: 83
End: 2018-09-27
Payer: MEDICARE

## 2018-09-27 DIAGNOSIS — S62.616D CLOSED DISPLACED FRACTURE OF PROXIMAL PHALANX OF RIGHT LITTLE FINGER WITH ROUTINE HEALING, SUBSEQUENT ENCOUNTER: Primary | ICD-10-CM

## 2018-09-27 PROCEDURE — 97140 MANUAL THERAPY 1/> REGIONS: CPT

## 2018-09-27 PROCEDURE — 97110 THERAPEUTIC EXERCISES: CPT

## 2018-09-27 NOTE — PROGRESS NOTES
Daily Note     Today's date: 2018  Patient name: Tyler Garduno  : 1935  MRN: [id number]  Referring provider: Rhonda Andujar MD  Dx:   Encounter Diagnosis     ICD-10-CM    1  Closed displaced fracture of proximal phalanx of right little finger with routine healing, subsequent encounter S62 616D        Subjective: "It is doing better today "      Objective: See treatment diary below    Specialty Daily Treatment Diary     Manual  2018   Composite Digit Stretch 30 sec x 6 30 sec x 6 30 sec x 6 30 sec x 6 30 sec x 6   Digit Extension Stretch 30 sec x 6 30 sec x 6 30 sec x 6 30 sec x 6 30 sec x 6   Edema Massage 10 Min 10 Min 10 Min 10 Min 10 48 tEhel Ko                       Exercise Diary  2018   DIP Blocking X 30 X 30 X 30 X 30 X 30   PIP Blocking X 30 X 30 X 30 X 30 X 30   MCP Blocking X 30 X 30 X 30 X 30 X 30   TGE X 10 X 10 X 10 X 10 X 10   Theraputty Grasps Yellow x 20 Yellow x 20 Red x 20  Yellow x 20   Digi-Flex Red x 20 Red x 20 Green x 20     Theraputty Intrinsic Pushes Yellow x 20 Yellow x 20 Red x 20  Yellow x 20   Finger Abd/adduction X 20 X 20 X 20 X 20 X 20   Single Digi-Flex        Theraputty Finger Abduction  Yellow x 20 Red x 20  Yellow x 20   Theraputty Cone Presses Yellow x 20 Yellow x 20 Red x 20  Yellow x 20   Golf Ball Manipulation X 10 X 10 X 10  X 10   Clothespin Pinches   X 10  X 10                                                               Modalities 2018   Paraffin w/ MH        CP w/ Coban 10 Min 10 Min 10 Min 10 Min 10 Min   Splint Fabrication  Oval-8 Finger Extension Splint            Assessment: Tolerated treatment well  Patient exhibited good technique with therapeutic exercises and would benefit from continued OT      Plan: Continue per plan of care  Progress treatment as tolerated

## 2018-10-02 ENCOUNTER — OFFICE VISIT (OUTPATIENT)
Dept: OCCUPATIONAL THERAPY | Facility: CLINIC | Age: 83
End: 2018-10-02
Payer: MEDICARE

## 2018-10-02 DIAGNOSIS — S62.616D CLOSED DISPLACED FRACTURE OF PROXIMAL PHALANX OF RIGHT LITTLE FINGER WITH ROUTINE HEALING, SUBSEQUENT ENCOUNTER: Primary | ICD-10-CM

## 2018-10-02 PROCEDURE — 97110 THERAPEUTIC EXERCISES: CPT

## 2018-10-02 PROCEDURE — 97140 MANUAL THERAPY 1/> REGIONS: CPT

## 2018-10-02 NOTE — PROGRESS NOTES
Daily Note     Today's date: 10/2/2018  Patient name: Chirag Dumas  : 1935  MRN: [id number]  Referring provider: Yoana Bautista MD  Dx:   Encounter Diagnosis     ICD-10-CM    1  Closed displaced fracture of proximal phalanx of right little finger with routine healing, subsequent encounter S62 616D          Subjective: "It is getting better "      Objective: See treatment diary below    Specialty Daily Treatment Diary     Manual  10/02/2018  2018 2018 2018   Composite Digit Stretch 30 sec x 6  30 sec x 6 30 sec x 6 30 sec x 6   Digit Extension Stretch 30 sec x 6  30 sec x 6 30 sec x 6 30 sec x 6   Edema Massage 10 Min  10 Min 10 Min 10 Min                       Exercise Diary  10/02/2018  2018 2018 2018   DIP Blocking X 30  X 30 X 30 X 30   PIP Blocking X 30  X 30 X 30 X 30   MCP Blocking X 30  X 30 X 30 X 30   TGE X 10  X 10 X 10 X 10   Theraputty Grasps Yellow x 20  Red x 20  Yellow x 20   Digi-Flex Red x 20  Green x 20     Theraputty Intrinsic Pushes Yellow x 20  Red x 20  Yellow x 20   Finger Abd/adduction X 20  X 20 X 20 X 20   Single Digi-Flex        Theraputty Finger Abduction Yellow x 20  Red x 20  Yellow x 20   Theraputty Cone Presses Yellow x 20  Red x 20  Yellow x 20   Golf Ball Manipulation X 10  X 10  X 10   Clothespin Pinches X 10  X 10  X 10                                                               Modalities 10/02/2018  2018 2018 2018   Paraffin w/ MH        CP w/ Coban 10 Min  10 Min 10 Min 10 Min   Splint Fabrication              Assessment: Tolerated treatment well  Patient exhibited good technique with therapeutic exercises and would benefit from continued OT      Plan: Continue per plan of care  Progress treatment as tolerated

## 2018-10-04 ENCOUNTER — OFFICE VISIT (OUTPATIENT)
Dept: OCCUPATIONAL THERAPY | Facility: CLINIC | Age: 83
End: 2018-10-04
Payer: MEDICARE

## 2018-10-04 DIAGNOSIS — S62.616D CLOSED DISPLACED FRACTURE OF PROXIMAL PHALANX OF RIGHT LITTLE FINGER WITH ROUTINE HEALING, SUBSEQUENT ENCOUNTER: Primary | ICD-10-CM

## 2018-10-04 PROCEDURE — 97018 PARAFFIN BATH THERAPY: CPT

## 2018-10-04 PROCEDURE — 97110 THERAPEUTIC EXERCISES: CPT

## 2018-10-04 PROCEDURE — 97140 MANUAL THERAPY 1/> REGIONS: CPT

## 2018-10-04 NOTE — PROGRESS NOTES
Daily Note     Today's date: 10/4/2018  Patient name: Ada Greene  : 1935  MRN: [id number]  Referring provider: Marguerite Reno MD  Dx:   Encounter Diagnosis     ICD-10-CM    1  Closed displaced fracture of proximal phalanx of right little finger with routine healing, subsequent encounter S62 616D        Subjective: "Will it get straight "      Objective: See treatment diary below    Specialty Daily Treatment Diary     Manual  10/02/2018 10/04/2018  2018 2018   Composite Digit Stretch 30 sec x 6 30 sec x 6  30 sec x 6 30 sec x 6   Digit Extension Stretch 30 sec x 6 30 sec x 6  30 sec x 6 30 sec x 6   Edema Massage 10 Min 10 Min  10 Min 10 Min                       Exercise Diary  10/02/2018 10/04/2018  2018 2018   DIP Blocking X 30 X 30  X 30 X 30   PIP Blocking X 30 X 30  X 30 X 30   MCP Blocking X 30 X 30  X 30 X 30   TGE X 10 X 10  X 10 X 10   Theraputty Grasps Yellow x 20 Yellow x 20   Yellow x 20   Digi-Flex Red x 20 Green x 20      Theraputty Intrinsic Pushes Yellow x 20 Yellow x 20   Yellow x 20   Finger Abd/adduction X 20 X 20  X 20 X 20   Single Digi-Flex        Theraputty Finger Abduction Yellow x 20 Yellow x 20   Yellow x 20   Theraputty Cone Presses Yellow x 20 Yellow x 20   Yellow x 20   Golf Ball Manipulation X 10 X 10   X 10   Clothespin Pinches X 10 X 10   X 10                                                               Modalities 10/02/2018 10/04/2018  2018 2018   Paraffin w/ MH  10 Min      CP w/ Coban Performed   10 Min 10 Min   Splint Fabrication              Assessment: Tolerated treatment well  Patient exhibited good technique with therapeutic exercises and would benefit from continued OT  Patient given smaller silicone gel sleeve to decrease swelling  Plan: Continue per plan of care  Progress treatment as tolerated

## 2018-10-09 ENCOUNTER — OFFICE VISIT (OUTPATIENT)
Dept: OCCUPATIONAL THERAPY | Facility: CLINIC | Age: 83
End: 2018-10-09
Payer: MEDICARE

## 2018-10-09 DIAGNOSIS — S62.616D CLOSED DISPLACED FRACTURE OF PROXIMAL PHALANX OF RIGHT LITTLE FINGER WITH ROUTINE HEALING, SUBSEQUENT ENCOUNTER: Primary | ICD-10-CM

## 2018-10-09 PROCEDURE — 97140 MANUAL THERAPY 1/> REGIONS: CPT

## 2018-10-09 PROCEDURE — 97150 GROUP THERAPEUTIC PROCEDURES: CPT

## 2018-10-09 PROCEDURE — 97110 THERAPEUTIC EXERCISES: CPT

## 2018-10-09 NOTE — PROGRESS NOTES
Daily Note     Today's date: 10/9/2018  Patient name: Darya Willis  : 1935  MRN: [id number]  Referring provider: Libby Evans MD  Dx:   Encounter Diagnosis     ICD-10-CM    1  Closed displaced fracture of proximal phalanx of right little finger with routine healing, subsequent encounter S62 616D        Subjective: "I put the sleeve on "      Objective: See treatment diary below    Specialty Daily Treatment Diary     Manual  10/02/2018 10/04/2018 10/09/2018  2018   Composite Digit Stretch 30 sec x 6 30 sec x 6 30 sec x 6  30 sec x 6   Digit Extension Stretch 30 sec x 6 30 sec x 6 30 sec x 6  30 sec x 6   Edema Massage 10 Min 10 Min 10 Min  10 Min                       Exercise Diary  10/02/2018 10/04/2018 10/09/2018  2018   DIP Blocking X 30 X 30 X 30  X 30   PIP Blocking X 30 X 30 X 30  X 30   MCP Blocking X 30 X 30 X 30  X 30   TGE X 10 X 10 X 10  X 10   Theraputty Grasps Yellow x 20 Yellow x 20 Yellow x 20  Yellow x 20   Digi-Flex Red x 20 Green x 20 Green x 20     Theraputty Intrinsic Pushes Yellow x 20 Yellow x 20 Yellow x 20  Yellow x 20   Finger Abd/adduction X 20 X 20 X 20  X 20   Single Digi-Flex        Theraputty Finger Abduction Yellow x 20 Yellow x 20 Yellow x 20  Yellow x 20   Theraputty Cone Presses Yellow x 20 Yellow x 20 Yellow x 20  Yellow x 20   Golf Ball Manipulation X 10 X 10 X 10  X 10   Clothespin Pinches X 10 X 10 X 10  X 10                                                               Modalities 10/02/2018 10/04/2018 10/09/2018  2018   Paraffin w/ MH  10 Min 10 Min     CP w/ Coban Performed    10 Min   Splint Fabrication              Assessment: Tolerated treatment well  Patient exhibited good technique with therapeutic exercises and would benefit from continued OT  Patient demonstrating with decreased swelling along small finger of right hand  Plan: Continue per plan of care  Progress treatment as tolerated

## 2018-10-11 ENCOUNTER — OFFICE VISIT (OUTPATIENT)
Dept: OCCUPATIONAL THERAPY | Facility: CLINIC | Age: 83
End: 2018-10-11
Payer: MEDICARE

## 2018-10-11 DIAGNOSIS — S62.616D CLOSED DISPLACED FRACTURE OF PROXIMAL PHALANX OF RIGHT LITTLE FINGER WITH ROUTINE HEALING, SUBSEQUENT ENCOUNTER: Primary | ICD-10-CM

## 2018-10-11 PROCEDURE — 97018 PARAFFIN BATH THERAPY: CPT

## 2018-10-11 PROCEDURE — 97110 THERAPEUTIC EXERCISES: CPT

## 2018-10-11 PROCEDURE — 97150 GROUP THERAPEUTIC PROCEDURES: CPT

## 2018-10-11 PROCEDURE — 97140 MANUAL THERAPY 1/> REGIONS: CPT

## 2018-10-11 NOTE — PROGRESS NOTES
Daily Note     Today's date: 10/11/2018  Patient name: Tawanna Leone  : 1935  MRN: [id number]  Referring provider: Laurence Roberson MD  Dx:   Encounter Diagnosis     ICD-10-CM    1  Closed displaced fracture of proximal phalanx of right little finger with routine healing, subsequent encounter S62 616D          Subjective: "It went down "      Objective: See treatment diary below    Specialty Daily Treatment Diary     Manual  10/02/2018 10/04/2018 10/09/2018 10/11/2018    Composite Digit Stretch 30 sec x 6 30 sec x 6 30 sec x 6 30 sec x 6    Digit Extension Stretch 30 sec x 6 30 sec x 6 30 sec x 6 30 sec x 6    Edema Massage 10 Min 10 Min 10 Min 10 Min                        Exercise Diary  10/02/2018 10/04/2018 10/09/2018 10/11/2018    DIP Blocking X 30 X 30 X 30 X 30    PIP Blocking X 30 X 30 X 30 X 30    MCP Blocking X 30 X 30 X 30 X 30    TGE X 10 X 10 X 10 X 10    Theraputty Grasps Yellow x 20 Yellow x 20 Red x 20 Red x 20    Digi-Flex Red x 20 Green x 20 Green x 20 Green x 20    Theraputty Intrinsic Pushes Yellow x 20 Yellow x 20 Red x 20 Red x 20    Finger Abd/adduction X 20 X 20 X 20 X 20    Single Digi-Flex        Theraputty Finger Abduction Yellow x 20 Yellow x 20 Yellow x 20 Red x 20    Theraputty Cone Presses Yellow x 20 Yellow x 20 Yellow x 20 Red x 20    Golf Ball Manipulation X 10 X 10 X 10 X 10    Clothespin Pinches X 10 X 10 X 10 X 10                                                                Modalities 10/02/2018 10/04/2018 10/09/2018 10/11/2018    Paraffin w/ MH  10 Min 10 Min 10 Min    CP w/ Coban Performed       Splint Fabrication              Assessment: Tolerated treatment well  Patient exhibited good technique with therapeutic exercises and would benefit from continued OT  Observable decrease in swelling noted since previous session  Plan: Continue per plan of care  Progress treatment as tolerated

## 2018-10-15 ENCOUNTER — OFFICE VISIT (OUTPATIENT)
Dept: OCCUPATIONAL THERAPY | Facility: CLINIC | Age: 83
End: 2018-10-15
Payer: MEDICARE

## 2018-10-15 DIAGNOSIS — S62.616D CLOSED DISPLACED FRACTURE OF PROXIMAL PHALANX OF RIGHT LITTLE FINGER WITH ROUTINE HEALING, SUBSEQUENT ENCOUNTER: Primary | ICD-10-CM

## 2018-10-15 PROCEDURE — 97140 MANUAL THERAPY 1/> REGIONS: CPT

## 2018-10-15 PROCEDURE — 97018 PARAFFIN BATH THERAPY: CPT

## 2018-10-15 PROCEDURE — 97110 THERAPEUTIC EXERCISES: CPT

## 2018-10-15 NOTE — PROGRESS NOTES
Daily Note     Today's date: 10/15/2018  Patient name: Gretel Pruett  : 1935  MRN: [id number]  Referring provider: Courtney Sales MD  Dx:   Encounter Diagnosis     ICD-10-CM    1  Closed displaced fracture of proximal phalanx of right little finger with routine healing, subsequent encounter S62 616D        Subjective: "How much longer do you think it will be "      Objective: See treatment diary below    Specialty Daily Treatment Diary     Manual  10/02/2018 10/04/2018 10/09/2018 10/11/2018 10/15/2018   Composite Digit Stretch 30 sec x 6 30 sec x 6 30 sec x 6 30 sec x 6 30 sec x 6   Digit Extension Stretch 30 sec x 6 30 sec x 6 30 sec x 6 30 sec x 6 30 sec x 6   Edema Massage 10 Min 10 Min 10 Min 10 Min 10 48 Ethel Ko                       Exercise Diary  10/02/2018 10/04/2018 10/09/2018 10/11/2018 10/15/2018   DIP Blocking X 30 X 30 X 30 X 30 X 30   PIP Blocking X 30 X 30 X 30 X 30 X 30   MCP Blocking X 30 X 30 X 30 X 30 X 30   TGE X 10 X 10 X 10 X 10 X 10   Theraputty Grasps Yellow x 20 Yellow x 20 Red x 20 Red x 20 Red x 20   Digi-Flex Red x 20 Green x 20 Green x 20 Green x 20 Green x 20   Theraputty Intrinsic Pushes Yellow x 20 Yellow x 20 Red x 20 Red x 20 Red x 20   Finger Abd/adduction X 20 X 20 X 20 X 20 X 20   Single Digi-Flex        Theraputty Finger Abduction Yellow x 20 Yellow x 20 Yellow x 20 Red x 20 Red x 20   Theraputty Cone Presses Yellow x 20 Yellow x 20 Yellow x 20 Red x 20 Red x 20   Golf Ball Manipulation X 10 X 10 X 10 X 10 X 10   Clothespin Pinches X 10 X 10 X 10 X 10 X 10                                                               Modalities 10/02/2018 10/04/2018 10/09/2018 10/11/2018 10/15/2018   Paraffin w/ MH  10 Min 10 Min 10 Min 10 Min   CP w/ Coban Performed       Splint Fabrication              Assessment: Tolerated treatment well  Patient exhibited good technique with therapeutic exercises and would benefit from continued OT      Plan: Continue per plan of care    Progress treatment as tolerated

## 2018-10-18 ENCOUNTER — OFFICE VISIT (OUTPATIENT)
Dept: OCCUPATIONAL THERAPY | Facility: CLINIC | Age: 83
End: 2018-10-18
Payer: MEDICARE

## 2018-10-18 DIAGNOSIS — S62.616D CLOSED DISPLACED FRACTURE OF PROXIMAL PHALANX OF RIGHT LITTLE FINGER WITH ROUTINE HEALING, SUBSEQUENT ENCOUNTER: Primary | ICD-10-CM

## 2018-10-18 PROCEDURE — 97110 THERAPEUTIC EXERCISES: CPT

## 2018-10-18 PROCEDURE — 97018 PARAFFIN BATH THERAPY: CPT

## 2018-10-18 PROCEDURE — 97140 MANUAL THERAPY 1/> REGIONS: CPT

## 2018-10-18 NOTE — PROGRESS NOTES
Daily Note     Today's date: 10/18/2018  Patient name: Rima Goodrich  : 1935  MRN: [id number]  Referring provider: Kaylan Quinn MD  Dx:   Encounter Diagnosis     ICD-10-CM    1  Closed displaced fracture of proximal phalanx of right little finger with routine healing, subsequent encounter S68 616D        Subjective: "It is straighter "       Objective: See treatment diary below    Specialty Daily Treatment Diary     Manual  10/18/2018  10/09/2018 10/11/2018 10/15/2018   Composite Digit Stretch 30 sec x 6  30 sec x 6 30 sec x 6 30 sec x 6   Digit Extension Stretch 30 sec x 6  30 sec x 6 30 sec x 6 30 sec x 6   Edema Massage 10 Min  10 Min 10 Min 10 Min                       Exercise Diary  10/18/2018  10/09/2018 10/11/2018 10/15/2018   DIP Blocking X 30  X 30 X 30 X 30   PIP Blocking X 30  X 30 X 30 X 30   MCP Blocking X 30  X 30 X 30 X 30   TGE X 10  X 10 X 10 X 10   Theraputty Grasps Red x 20  Red x 20 Red x 20 Red x 20   Digi-Flex Green x 20  Green x 20 Green x 20 Green x 20   Theraputty Intrinsic Pushes Red x 20  Red x 20 Red x 20 Red x 20   Finger Abd/adduction X 20  X 20 X 20 X 20   Single Digi-Flex        Theraputty Finger Abduction Red x 20  Yellow x 20 Red x 20 Red x 20   Theraputty Cone Presses Red x 20  Yellow x 20 Red x 20 Red x 20   Golf Ball Manipulation X 10  X 10 X 10 X 10   Clothespin Pinches X 10  X 10 X 10 X 10                                                               Modalities 10/18/2018  10/09/2018 10/11/2018 10/15/2018   Paraffin w/ MH 10 Min  10 Min 10 Min 10 Min   CP w/ Coban        Splint Fabrication              Assessment: Tolerated treatment well  Patient exhibited good technique with therapeutic exercises and would benefit from continued OT  Therapist focused on passive digit flexion this session due to decreased active range of motion  Increased ROM noted after passive stretching  Plan: Continue per plan of care  Progress treatment as tolerated

## 2018-10-19 ENCOUNTER — OFFICE VISIT (OUTPATIENT)
Dept: URGENT CARE | Facility: CLINIC | Age: 83
End: 2018-10-19
Payer: MEDICARE

## 2018-10-19 VITALS
HEIGHT: 63 IN | OXYGEN SATURATION: 98 % | HEART RATE: 80 BPM | TEMPERATURE: 97.9 F | WEIGHT: 145 LBS | DIASTOLIC BLOOD PRESSURE: 70 MMHG | RESPIRATION RATE: 20 BRPM | SYSTOLIC BLOOD PRESSURE: 124 MMHG | BODY MASS INDEX: 25.69 KG/M2

## 2018-10-19 DIAGNOSIS — N39.0 URINARY TRACT INFECTION WITHOUT HEMATURIA, SITE UNSPECIFIED: Primary | ICD-10-CM

## 2018-10-19 DIAGNOSIS — R35.0 URINARY FREQUENCY: ICD-10-CM

## 2018-10-19 LAB
SL AMB  POCT GLUCOSE, UA: ABNORMAL
SL AMB LEUKOCYTE ESTERASE,UA: ABNORMAL
SL AMB POCT BILIRUBIN,UA: ABNORMAL
SL AMB POCT BLOOD,UA: ABNORMAL
SL AMB POCT CLARITY,UA: CLEAR
SL AMB POCT COLOR,UA: YELLOW
SL AMB POCT KETONES,UA: ABNORMAL
SL AMB POCT NITRITE,UA: ABNORMAL
SL AMB POCT PH,UA: 6
SL AMB POCT SPECIFIC GRAVITY,UA: 1.01
SL AMB POCT URINE PROTEIN: ABNORMAL
SL AMB POCT UROBILINOGEN: 0.2

## 2018-10-19 PROCEDURE — 87086 URINE CULTURE/COLONY COUNT: CPT | Performed by: PHYSICIAN ASSISTANT

## 2018-10-19 PROCEDURE — 87186 SC STD MICRODIL/AGAR DIL: CPT | Performed by: PHYSICIAN ASSISTANT

## 2018-10-19 PROCEDURE — 87077 CULTURE AEROBIC IDENTIFY: CPT | Performed by: PHYSICIAN ASSISTANT

## 2018-10-19 PROCEDURE — G0463 HOSPITAL OUTPT CLINIC VISIT: HCPCS | Performed by: PHYSICIAN ASSISTANT

## 2018-10-19 PROCEDURE — 99213 OFFICE O/P EST LOW 20 MIN: CPT | Performed by: PHYSICIAN ASSISTANT

## 2018-10-19 PROCEDURE — 81002 URINALYSIS NONAUTO W/O SCOPE: CPT | Performed by: PHYSICIAN ASSISTANT

## 2018-10-19 RX ORDER — CEPHALEXIN 500 MG/1
500 CAPSULE ORAL EVERY 8 HOURS SCHEDULED
Qty: 21 CAPSULE | Refills: 0 | Status: SHIPPED | OUTPATIENT
Start: 2018-10-19 | End: 2018-10-26

## 2018-10-19 NOTE — PROGRESS NOTES
[id number]    NAME: Chirag Dumas is a 82 y o  female  : 1935    MRN: [id number]  DATE: 2018  TIME: 10:36 AM    Assessment and Plan   Urinary tract infection without hematuria, site unspecified [N39 0]  1  Urinary tract infection without hematuria, site unspecified  cephalexin (KEFLEX) 500 mg capsule    Urine culture   2  Urinary frequency  POCT urine dip       Patient Instructions     Patient Instructions   I have prescribed an antibiotic for the infection  Please take the antibiotic as prescribed and finish the entire prescription  I recommend that the patient takes an over the counter probiotic or eats yogurt with live cultures in it [place]) to keep good bacteria in the gut and help prevent diarrhea  If not improving over the next 7-10 days, follow up with PCP  Go to the emergency department if:   You have severe back, side, or abdominal pain  You have fever and shaking chills  You vomit several times in a row  Contact your primary care provider if:   Your symptoms do not go away, even after treatment  Drink more liquids  Liquids help flush out bacteria that may be causing an infection  Chief Complaint     Chief Complaint   Patient presents with    Urinary Frequency     started monday       History of Present Illness   82year old female here with urinary frequency and burning with urination for the last week  Denies any lower back pain or abdominal pain  No nausea or vomiting  No fever chills  Review of Systems   Review of Systems   Constitutional: Negative for activity change, appetite change, chills, diaphoresis, fatigue, fever and unexpected weight change  HENT: Negative for congestion, dental problem, hearing loss, sinus pressure, sneezing, sore throat, tinnitus, trouble swallowing and voice change  Eyes: Negative for photophobia, redness and visual disturbance     Respiratory: Negative for apnea, cough, chest tightness, shortness of breath, wheezing and stridor  Cardiovascular: Negative for chest pain, palpitations and leg swelling  Gastrointestinal: Negative for abdominal distention, abdominal pain, blood in stool, constipation, diarrhea, nausea and vomiting  Endocrine: Negative for cold intolerance, heat intolerance, polydipsia, polyphagia and polyuria  Genitourinary: Positive for dysuria and frequency  Negative for difficulty urinating, flank pain, hematuria and urgency  Musculoskeletal: Negative for arthralgias, back pain, gait problem, joint swelling, myalgias, neck pain and neck stiffness  Skin: Negative for pallor, rash and wound  Neurological: Negative for dizziness, tremors, seizures, speech difficulty, weakness and headaches  Hematological: Negative for adenopathy  Does not bruise/bleed easily  Psychiatric/Behavioral: Negative for agitation, confusion, dysphoric mood and sleep disturbance  The patient is not nervous/anxious  All other systems reviewed and are negative  Current Medications     Current Outpatient Prescriptions:     Calcium Carbonate (CALTRATE 600) 1500 (600 Ca) MG TABS, 1 tablet daily, Disp: , Rfl:     cephalexin (KEFLEX) 500 mg capsule, Take 1 capsule (500 mg total) by mouth every 8 (eight) hours for 7 days, Disp: 21 capsule, Rfl: 0    Current Allergies     Allergies as of 10/19/2018 - Reviewed 10/19/2018   Allergen Reaction Noted    Doxycycline Hives     Naproxen Hives           The following portions of the patient's history were reviewed and updated as appropriate: allergies, current medications, past family history, past medical history, past social history, past surgical history and problem list    Past Medical History:   Diagnosis Date    Osteoporosis      History reviewed  No pertinent surgical history  No family history on file  Social History     Social History    Marital status:       Spouse name: N/A    Number of children: N/A    Years of education: N/A Occupational History    Not on file  Social History Main Topics    Smoking status: Never Smoker    Smokeless tobacco: Not on file    Alcohol use No    Drug use: No    Sexual activity: Not on file     Other Topics Concern    Not on file     Social History Narrative    No narrative on file     Medications have been verified  Objective   /70   Pulse 80   Temp 97 9 °F (36 6 °C) (Tympanic)   Resp 20   Ht 5' 3" (1 6 m)   Wt 65 8 kg (145 lb)   SpO2 98%   BMI 25 69 kg/m²      Physical Exam   Physical Exam   Constitutional: She appears well-developed and well-nourished  No distress  HENT:   Head: Normocephalic  Right Ear: External ear normal    Left Ear: External ear normal    Nose: Nose normal    Mouth/Throat: Oropharynx is clear and moist  No oropharyngeal exudate  Neck: Normal range of motion  Neck supple  Cardiovascular: Normal rate, regular rhythm and normal heart sounds  No murmur heard  Pulmonary/Chest: Effort normal and breath sounds normal  No respiratory distress  She has no wheezes  She has no rales  Abdominal: Soft  Bowel sounds are normal  There is no tenderness  There is no CVA tenderness  Musculoskeletal: Normal range of motion  Lymphadenopathy:     She has no cervical adenopathy  Skin: Skin is warm  No rash noted  Vitals reviewed

## 2018-10-21 LAB — BACTERIA UR CULT: ABNORMAL

## 2018-10-23 ENCOUNTER — OFFICE VISIT (OUTPATIENT)
Dept: OCCUPATIONAL THERAPY | Facility: CLINIC | Age: 83
End: 2018-10-23
Payer: MEDICARE

## 2018-10-23 DIAGNOSIS — S62.616D CLOSED DISPLACED FRACTURE OF PROXIMAL PHALANX OF RIGHT LITTLE FINGER WITH ROUTINE HEALING, SUBSEQUENT ENCOUNTER: Primary | ICD-10-CM

## 2018-10-23 PROCEDURE — 97140 MANUAL THERAPY 1/> REGIONS: CPT

## 2018-10-23 PROCEDURE — 97110 THERAPEUTIC EXERCISES: CPT

## 2018-10-23 PROCEDURE — 97018 PARAFFIN BATH THERAPY: CPT

## 2018-10-23 NOTE — PROGRESS NOTES
Daily Note     Today's date: 10/23/2018  Patient name: Tanvi Russo  : 1935  MRN: [id number]  Referring provider: Jono Wyatt MD  Dx:   Encounter Diagnosis     ICD-10-CM    1  Closed displaced fracture of proximal phalanx of right little finger with routine healing, subsequent encounter S62 616D        Subjective: "It just wont go straight "      Objective: See treatment diary below    Specialty Daily Treatment Diary     Manual  10/18/2018 10/23/2018  10/11/2018 10/15/2018   Composite Digit Stretch 30 sec x 6 30 sec x 6  30 sec x 6 30 sec x 6   Digit Extension Stretch 30 sec x 6 30 sec x 6  30 sec x 6 30 sec x 6   Edema Massage 10 Min 10 Min  10 Min 10 Min                       Exercise Diary  10/18/2018 10/23/2018  10/11/2018 10/15/2018   DIP Blocking X 30 X 30  X 30 X 30   PIP Blocking X 30 X 30  X 30 X 30   MCP Blocking X 30 X 30  X 30 X 30   TGE X 10 X 10  X 10 X 10   Theraputty Grasps Red x 20 Red x 20  Red x 20 Red x 20   Digi-Flex Green x 20 Green x 20  Green x 20 Green x 20   Theraputty Intrinsic Pushes Red x 20 Red x 20  Red x 20 Red x 20   Finger Abd/adduction X 20 X 20  X 20 X 20   Single Digi-Flex        Theraputty Finger Abduction Red x 20 Red x 20  Red x 20 Red x 20   Theraputty Cone Presses Red x 20 Red x 20  Red x 20 Red x 20   Golf Ball Manipulation X 10 X 10  X 10 X 10   Clothespin Pinches X 10 X 10  X 10 X 10                                                               Modalities 10/18/2018 10/23/2018  10/11/2018 10/15/2018   Paraffin w/ MH 10 Min 10 Min  10 Min 10 Min   CP w/ Coban        Splint Fabrication              Assessment: Tolerated treatment well  Patient exhibited good technique with therapeutic exercises and would benefit from continued OT  Patient presenting with increased digit ROM for both flexion and extension as compared to previous session  Plan: Continue per plan of care  Progress treatment as tolerated

## 2018-10-25 ENCOUNTER — EVALUATION (OUTPATIENT)
Dept: OCCUPATIONAL THERAPY | Facility: CLINIC | Age: 83
End: 2018-10-25
Payer: MEDICARE

## 2018-10-25 ENCOUNTER — TRANSCRIBE ORDERS (OUTPATIENT)
Dept: PHYSICAL THERAPY | Facility: CLINIC | Age: 83
End: 2018-10-25

## 2018-10-25 DIAGNOSIS — S62.616D CLOSED DISPLACED FRACTURE OF PROXIMAL PHALANX OF RIGHT LITTLE FINGER WITH ROUTINE HEALING, SUBSEQUENT ENCOUNTER: Primary | ICD-10-CM

## 2018-10-25 PROCEDURE — 97110 THERAPEUTIC EXERCISES: CPT

## 2018-10-25 PROCEDURE — L3933 FO W/O JOINTS CF: HCPCS

## 2018-10-25 PROCEDURE — 97140 MANUAL THERAPY 1/> REGIONS: CPT

## 2018-10-25 PROCEDURE — G8985 CARRY GOAL STATUS: HCPCS

## 2018-10-25 PROCEDURE — 97168 OT RE-EVAL EST PLAN CARE: CPT

## 2018-10-25 PROCEDURE — G8984 CARRY CURRENT STATUS: HCPCS

## 2018-10-25 NOTE — PROGRESS NOTES
OT Re-Evaluation    Today's date: 10/25/2018  Patient name: Gary El  : 1935  MRN: [id number]  Referring provider: Meme Beck MD  Dx:   Encounter Diagnosis     ICD-10-CM    1  Closed displaced fracture of proximal phalanx of right little finger with routine healing, subsequent encounter S62 616D        Assessment    Assessment details: Patient presenting to OP OT services with a closed nondisplaced fracture of proximal phalanx of right small finger  Patient reports experiencing a LOC with a fall on 2018  Patient was evaluated at [facility] ER the following day on 2018  X-Ray completed with fracture of right small finger noted  Patient also had X-Rays completed of head and neck  Patient also presenting with black and blueness of left elbow  Patient had pinning completed on 2018 by Dr Corinna Silver to realign right small finger  Patient had pins removed on 2018  Patient has a follow-up on 2018  Patient presents with ulnar gutter splint placed since surgery  Patient has consistently attended OP OT services since start of care  Patient reports increased ROM, strength and functional use since start of care  Patient reports completing yard over the weekend and experienced increased swelling and pain  Patient reports she does not know the etiology of new onset of symptoms  Patient reports bumping right small finger approximately four to four weeks ago  Patient demonstrating with a decrease in symptoms at the time of incident  Patient has demonstrated increased ROM and functional use since recent issue  Patient reports residual limitations for digit extension and independent use of digit  Understanding of Dx/Px/POC: good   Prognosis: good    Goals  STGs    Pt will increase  strength by 5-10#  - Met    Pt will increase digit ROM by 50%   - Met    Pt will demonstrate decrease in edema by 25% - Met    Independent with HEP - Met      LTGs     Pt will increase  strength by an additional 5-10#  - Met    Pt will increase digit ROM to WNL - Partially Met    Pt will demonstrate decrease in edema by 50% -  Met    Pt will report decrease in morning stiffness by 50% - Partially Met    Pt will report an increase in ADL/IADL participation - Partially Met          Plan  Patient would benefit from: skilled occupational therapy  Planned modality interventions: unattended electrical stimulation, ultrasound, thermotherapy: paraffin bath, thermotherapy: hydrocollator packs and cryotherapy  Planned therapy interventions: joint mobilization, manual therapy, massage, patient education, strengthening, stretching, therapeutic activities, therapeutic exercise, fine motor coordination training and home exercise program  Frequency: 2x week  Duration in visits: 8  Duration in weeks: 4  Treatment plan discussed with: patient  Plan details: Patient has Consistent attended OP OT services since start of care  Patient has made steady progress towards established goals  Pt has shown improvement start of care, demonstrating decreased pain, increased strength, increased ROM and increased activity  However, pt remains with impairments including remaining ROM and strength deficits  Pt would benefit from continuation of skilled therapy services to further progress POC and address remaining deficits at this time  Thank you!             Subjective Evaluation    History of Present Illness  Date of onset: 2018  Date of surgery: 2018  Mechanism of injury: S/p fall  Pain  Current pain ratin  At best pain ratin  At worst pain ratin  Location: R Small Finger    Hand dominance: right      Diagnostic Tests  X-ray: abnormal  Treatments  Previous treatment: immobilization  Current treatment: immobilization and occupational therapy  Patient Goals  Patient goals for therapy: decreased edema, decreased pain, increased motion, increased strength and independence with ADLs/IADLs          Objective Neurological Testing     Sensation     Wrist/Hand   Left   Intact: light touch    Right   Intact: light touch    Additional Neurological Details  No sensation deficits noted    Active Range of Motion     Left Wrist   Normal active range of motion    Right Wrist   Normal active range of motion    Left Thumb   Opposition: WNL    Right Thumb   Opposition: WNL    Right Digits   Flexion   Little     MCP: 45    PIP: 70    DIP: 75  Extension   Little     MCP: 0    PIP: 30    DIP: 10    Additional Active Range of Motion Details  Patient presenting with soft composite fist of right hand with 1 cm in distance to distal palmar crease with right small finger  Patient demonstrating with increased digit flexion for MCP joint of small finger and increase in digit extension at PIP and DIP joint of right small finger       Strength/Myotome Testing     Left Wrist/Hand      (2nd hand position)     Trial 1: 42    Right Wrist/Hand   Wrist extension: 4-  Wrist flexion: 4-  Radial deviation: 4-  Ulnar deviation: 4-     (2nd hand position)     Trial 1: 38    Additional Strength Details  Increased  strength noted    Swelling     Left Wrist/Hand   Little     Proximal: 5 75 cm    Middle: 5 25 cm    Distal: 4 75 cm    Right Wrist/Hand   Little     Proximal: 6 cm    Middle: 5 5 cm    Distal: 5 cm    Additional Swelling Details  Decrease in swelling noted      Flowsheet Rows      Most Recent Value   PT/OT G-Codes   Current Score  67   Projected Score  59   Assessment Type  Re-evaluation   G code set  Carrying, Moving & Handling Objects   Carrying, Moving and Handling Objects Current Status ()  CJ   Carrying, Moving and Handling Objects Goal Status ()  CK        Subjective: "It just wont go straight "      Objective: See treatment diary below    Specialty Daily Treatment Diary     Manual  10/18/2018 10/23/2018 10/25/2018  10/15/2018   Composite Digit Stretch 30 sec x 6 30 sec x 6 30 sec x 6  30 sec x 6   Digit Extension Stretch 30 sec x 6 30 sec x 6 30 sec x 6  30 sec x 6   Edema Massage 10 Min 10 Min 10 Min  10 Min                       Exercise Diary  10/18/2018 10/23/2018 10/25/2018  10/15/2018   DIP Blocking X 30 X 30 X 30  X 30   PIP Blocking X 30 X 30 X 30  X 30   MCP Blocking X 30 X 30 X 30  X 30   TGE X 10 X 10 X 10  X 10   Theraputty Grasps Red x 20 Red x 20 Red x 20  Red x 20   Digi-Flex Green x 20 Green x 20 Green x 20  Green x 20   Theraputty Intrinsic Pushes Red x 20 Red x 20 Red x 20  Red x 20   Finger Abd/adduction X 20 X 20 X 20  X 20   Single Digi-Flex        Theraputty Finger Abduction Red x 20 Red x 20 Red x 20  Red x 20   Theraputty Cone Presses Red x 20 Red x 20 Red x 20  Red x 20   Golf Ball Manipulation X 10 X 10 X 10  X 10   Clothespin Pinches X 10 X 10 X 10  X 10                                                               Modalities 10/18/2018 10/23/2018 10/25/2018  10/15/2018   Paraffin w/ MH 10 Min 10 Min 10 Min  10 Min   CP w/ Coban        Splint Fabrication   Digit Extension Splint         Patient tolerated session well with increases as tolerated  Therapist fabricated additional static digit extension splint with velcro strap to increase tightness and digit extension  Patient reports comfort of splint and demonstrated the ability to don/doff splint  Therapist fabricated custom fitted static digit extension splint this session  Patient reports no problem areas at this time  Patient verbalized good fit without complaints  Patient educated on rationale, splint wear schedule, splint care and donning/doffing  Patient verbalized understanding of education  Patient instructed to contact clinic if issues arise regarding splint  Therapist answered all questions and concerns regarding splint this session  Follow-up prn

## 2018-10-25 NOTE — LETTER
2018    Ross Hernández MD  [address] [place] [address] 110  [address]    Patient: Princess Arriaga   YOB: 1935   Date of Visit: 10/25/2018     Encounter Diagnosis     ICD-10-CM    1  Closed displaced fracture of proximal phalanx of right little finger with routine healing, subsequent encounter S62 616D        Dear Dr Ninfa Ruiz:    Please review the attached Plan of Care from [facility] recent visit  Please verify that you agree therapy should continue by signing the attached document and sending it back to our office  If you have any questions or concerns, please don't hesitate to call  Sincerely,    Jackie Marcos, OT      Referring Provider:     I certify that I have read the below Plan of Care and certify the need for these services furnished under this plan of treatment while under my care  Ross Hernández MD  126 [facility]  49  [facility]: [phone]        OT Re-Evaluation    Today's date: 10/25/2018  Patient name: Princess Arriaga  : 1935  MRN: [id number]  Referring provider: Yuki Mercado MD  Dx:   Encounter Diagnosis     ICD-10-CM    1  Closed displaced fracture of proximal phalanx of right little finger with routine healing, subsequent encounter S62 616D        Assessment    Assessment details: Patient presenting to OP OT services with a closed nondisplaced fracture of proximal phalanx of right small finger  Patient reports experiencing a LOC with a fall on 2018  Patient was evaluated at [facility] ER the following day on 2018  X-Ray completed with fracture of right small finger noted  Patient also had X-Rays completed of head and neck  Patient also presenting with black and blueness of left elbow  Patient had pinning completed on 2018 by Dr Ninfa Ruiz to realign right small finger  Patient had pins removed on 2018  Patient has a follow-up on 2018   Patient presents with ulnar gutter splint placed since surgery  Patient has consistently attended OP OT services since start of care  Patient reports increased ROM, strength and functional use since start of care  Patient reports completing yard over the weekend and experienced increased swelling and pain  Patient reports she does not know the etiology of new onset of symptoms  Patient reports bumping right small finger approximately four to four weeks ago  Patient demonstrating with a decrease in symptoms at the time of incident  Patient has demonstrated increased ROM and functional use since recent issue  Patient reports residual limitations for digit extension and independent use of digit  Understanding of Dx/Px/POC: good   Prognosis: good    Goals  STGs    Pt will increase  strength by 5-10#  - Met    Pt will increase digit ROM by 50%  - Met    Pt will demonstrate decrease in edema by 25% - Met    Independent with HEP - Met      LTGs     Pt will increase  strength by an additional 5-10#  - Met    Pt will increase digit ROM to WNL - Partially Met    Pt will demonstrate decrease in edema by 50% -  Met    Pt will report decrease in morning stiffness by 50% - Partially Met    Pt will report an increase in ADL/IADL participation - Partially Met          Plan  Patient would benefit from: skilled occupational therapy  Planned modality interventions: unattended electrical stimulation, ultrasound, thermotherapy: paraffin bath, thermotherapy: hydrocollator packs and cryotherapy  Planned therapy interventions: joint mobilization, manual therapy, massage, patient education, strengthening, stretching, therapeutic activities, therapeutic exercise, fine motor coordination training and home exercise program  Frequency: 2x week  Duration in visits: 8  Duration in weeks: 4  Treatment plan discussed with: patient  Plan details: Patient has Consistent attended OP OT services since start of care   Patient has made steady progress towards established goals  Pt has shown improvement start of care, demonstrating decreased pain, increased strength, increased ROM and increased activity  However, pt remains with impairments including remaining ROM and strength deficits  Pt would benefit from continuation of skilled therapy services to further progress POC and address remaining deficits at this time  Thank you! Subjective Evaluation    History of Present Illness  Date of onset: 2018  Date of surgery: 2018  Mechanism of injury: S/p fall  Pain  Current pain ratin  At best pain ratin  At worst pain ratin  Location: R Small Finger    Hand dominance: right      Diagnostic Tests  X-ray: abnormal  Treatments  Previous treatment: immobilization  Current treatment: immobilization and occupational therapy  Patient Goals  Patient goals for therapy: decreased edema, decreased pain, increased motion, increased strength and independence with ADLs/IADLs          Objective     Neurological Testing     Sensation     Wrist/Hand   Left   Intact: light touch    Right   Intact: light touch    Additional Neurological Details  No sensation deficits noted    Active Range of Motion     Left Wrist   Normal active range of motion    Right Wrist   Normal active range of motion    Left Thumb   Opposition: WNL    Right Thumb   Opposition: WNL    Right Digits   Flexion   Little     MCP: 45    PIP: 70    DIP: 75  Extension   Little     MCP: 0    PIP: 30    DIP: 10    Additional Active Range of Motion Details  Patient presenting with soft composite fist of right hand with 1 cm in distance to distal palmar crease with right small finger  Patient demonstrating with increased digit flexion for MCP joint of small finger and increase in digit extension at PIP and DIP joint of right small finger       Strength/Myotome Testing     Left Wrist/Hand      (2nd hand position)     Trial 1: 42    Right Wrist/Hand   Wrist extension: 4-  Wrist flexion: 4-  Radial deviation: 4-  Ulnar deviation: 4-     (2nd hand position)     Trial 1: 38    Additional Strength Details  Increased  strength noted    Swelling     Left Wrist/Hand   Little     Proximal: 5 75 cm    Middle: 5 25 cm    Distal: 4 75 cm    Right Wrist/Hand   Little     Proximal: 6 cm    Middle: 5 5 cm    Distal: 5 cm    Additional Swelling Details  Decrease in swelling noted      Flowsheet Rows      Most Recent Value   PT/OT G-Codes   Current Score  67   Projected Score  59   Assessment Type  Re-evaluation   G code set  Carrying, Moving & Handling Objects   Carrying, Moving and Handling Objects Current Status ()  CJ   Carrying, Moving and Handling Objects Goal Status ()  CK        Subjective: "It just wont go straight "      Objective: See treatment diary below    Specialty Daily Treatment Diary     Manual  10/18/2018 10/23/2018 10/25/2018  10/15/2018   Composite Digit Stretch 30 sec x 6 30 sec x 6 30 sec x 6  30 sec x 6   Digit Extension Stretch 30 sec x 6 30 sec x 6 30 sec x 6  30 sec x 6   Edema Massage 10 Min 10 Min 10 Min  10 Min                       Exercise Diary  10/18/2018 10/23/2018 10/25/2018  10/15/2018   DIP Blocking X 30 X 30 X 30  X 30   PIP Blocking X 30 X 30 X 30  X 30   MCP Blocking X 30 X 30 X 30  X 30   TGE X 10 X 10 X 10  X 10   Theraputty Grasps Red x 20 Red x 20 Red x 20  Red x 20   Digi-Flex Green x 20 Green x 20 Green x 20  Green x 20   Theraputty Intrinsic Pushes Red x 20 Red x 20 Red x 20  Red x 20   Finger Abd/adduction X 20 X 20 X 20  X 20   Single Digi-Flex        Theraputty Finger Abduction Red x 20 Red x 20 Red x 20  Red x 20   Theraputty Cone Presses Red x 20 Red x 20 Red x 20  Red x 20   Golf Ball Manipulation X 10 X 10 X 10  X 10   Clothespin Pinches X 10 X 10 X 10  X 10                                                               Modalities 10/18/2018 10/23/2018 10/25/2018  10/15/2018   Paraffin w/ MH 10 Min 10 Min 10 Min  10 Min   CP w/ [employer] Splint Fabrication   Digit Extension Splint         Patient tolerated session well with increases as tolerated  Therapist fabricated additional static digit extension splint with velcro strap to increase tightness and digit extension  Patient reports comfort of splint and demonstrated the ability to don/doff splint  Therapist fabricated custom fitted static digit extension splint this session  Patient reports no problem areas at this time  Patient verbalized good fit without complaints  Patient educated on rationale, splint wear schedule, splint care and donning/doffing  Patient verbalized understanding of education  Patient instructed to contact clinic if issues arise regarding splint  Therapist answered all questions and concerns regarding splint this session  Follow-up prn

## 2018-10-30 ENCOUNTER — OFFICE VISIT (OUTPATIENT)
Dept: OCCUPATIONAL THERAPY | Facility: CLINIC | Age: 83
End: 2018-10-30
Payer: MEDICARE

## 2018-10-30 DIAGNOSIS — S62.616D CLOSED DISPLACED FRACTURE OF PROXIMAL PHALANX OF RIGHT LITTLE FINGER WITH ROUTINE HEALING, SUBSEQUENT ENCOUNTER: Primary | ICD-10-CM

## 2018-10-30 PROCEDURE — 97140 MANUAL THERAPY 1/> REGIONS: CPT

## 2018-10-30 PROCEDURE — 97110 THERAPEUTIC EXERCISES: CPT

## 2018-10-30 PROCEDURE — 97018 PARAFFIN BATH THERAPY: CPT

## 2018-10-30 NOTE — PROGRESS NOTES
Daily Note     Today's date: 10/30/2018  Patient name: Tanvi Russo  : 1935  MRN: [id number]  Referring provider: Jono Wyatt MD  Dx:   Encounter Diagnosis     ICD-10-CM    1  Closed displaced fracture of proximal phalanx of right little finger with routine healing, subsequent encounter S62 616D        Subjective: "I hope it is getting straighter "      Objective: See treatment diary below    Specialty Daily Treatment Diary     Manual  10/18/2018 10/23/2018 10/25/2018 10/29/2018    Composite Digit Stretch 30 sec x 6 30 sec x 6 30 sec x 6 30 sec x 6    Digit Extension Stretch 30 sec x 6 30 sec x 6 30 sec x 6 30 sec x 6    Edema Massage 10 Min 10 Min 10 Min 10 Min                        Exercise Diary  10/18/2018 10/23/2018 10/25/2018 10/30/2018    DIP Blocking X 30 X 30 X 30 X 30    PIP Blocking X 30 X 30 X 30 X 30    MCP Blocking X 30 X 30 X 30 X 30    TGE X 10 X 10 X 10 X 10    Theraputty Grasps Red x 20 Red x 20 Red x 20 Red x 20    Digi-Flex Green x 20 Green x 20 Green x 20 Green x 20    Theraputty Intrinsic Pushes Red x 20 Red x 20 Red x 20 Red x 20    Finger Abd/adduction X 20 X 20 X 20 X 20    Single Digi-Flex        Theraputty Finger Abduction Red x 20 Red x 20 Red x 20 Red x 20    Theraputty Cone Presses Red x 20 Red x 20 Red x 20 Red x 20    Golf Ball Manipulation X 10 X 10 X 10 X 10    Clothespin Pinches X 10 X 10 X 10 X 10    Pinky Abd/Adduction    X 20    Pinky Place and Hold Digit Ext    X 20    Rincon Hold Pinky Opposition    10 sec x 5                                        Modalities 10/18/2018 10/23/2018 10/25/2018 10/30/2018    Paraffin w/ MH 10 Min 10 Min 10 Min 10 Min    CP w/ [employer]        Splint Fabrication   Digit Extension Splint           Assessment: Tolerated treatment well  Patient exhibited good technique with therapeutic exercises and would benefit from continued OT  Patient reports compliance with splint wear since last session   Therapist incorporated isolated pinky exercises to increase independent ROM of digit  Plan: Progress note during next visit  Progress treatment as tolerated

## 2018-11-01 ENCOUNTER — APPOINTMENT (OUTPATIENT)
Dept: OCCUPATIONAL THERAPY | Facility: CLINIC | Age: 83
End: 2018-11-01
Payer: MEDICARE

## 2018-11-06 ENCOUNTER — APPOINTMENT (OUTPATIENT)
Dept: OCCUPATIONAL THERAPY | Facility: CLINIC | Age: 83
End: 2018-11-06
Payer: MEDICARE

## 2018-11-08 ENCOUNTER — APPOINTMENT (OUTPATIENT)
Dept: OCCUPATIONAL THERAPY | Facility: CLINIC | Age: 83
End: 2018-11-08
Payer: MEDICARE

## 2018-11-13 ENCOUNTER — APPOINTMENT (OUTPATIENT)
Dept: OCCUPATIONAL THERAPY | Facility: CLINIC | Age: 83
End: 2018-11-13
Payer: MEDICARE

## 2018-11-14 ENCOUNTER — APPOINTMENT (EMERGENCY)
Dept: CT IMAGING | Facility: HOSPITAL | Age: 83
End: 2018-11-14
Payer: MEDICARE

## 2018-11-14 ENCOUNTER — HOSPITAL ENCOUNTER (EMERGENCY)
Facility: HOSPITAL | Age: 83
Discharge: HOME | End: 2018-11-14
Attending: EMERGENCY MEDICINE
Payer: MEDICARE

## 2018-11-14 VITALS
TEMPERATURE: 97.3 F | DIASTOLIC BLOOD PRESSURE: 76 MMHG | HEART RATE: 91 BPM | OXYGEN SATURATION: 95 % | SYSTOLIC BLOOD PRESSURE: 139 MMHG | RESPIRATION RATE: 18 BRPM

## 2018-11-14 DIAGNOSIS — R39.15 URINARY URGENCY: ICD-10-CM

## 2018-11-14 DIAGNOSIS — R33.9 URINARY RETENTION: Primary | ICD-10-CM

## 2018-11-14 LAB
ALBUMIN SERPL BCP-MCNC: 3.5 G/DL (ref 3.5–5)
ALP SERPL-CCNC: 71 U/L (ref 46–116)
ALT SERPL W P-5'-P-CCNC: 35 U/L (ref 12–78)
ANION GAP SERPL CALCULATED.3IONS-SCNC: 12 MMOL/L (ref 4–13)
AST SERPL W P-5'-P-CCNC: 29 U/L (ref 5–45)
BASOPHILS # BLD AUTO: 0.03 THOUSANDS/ΜL (ref 0–0.1)
BASOPHILS NFR BLD AUTO: 0 % (ref 0–1)
BILIRUB SERPL-MCNC: 0.2 MG/DL (ref 0.2–1)
BILIRUB UR QL STRIP: NEGATIVE
BUN SERPL-MCNC: 12 MG/DL (ref 5–25)
CALCIUM SERPL-MCNC: 9.2 MG/DL (ref 8.3–10.1)
CHLORIDE SERPL-SCNC: 102 MMOL/L (ref 100–108)
CLARITY UR: CLEAR
CO2 SERPL-SCNC: 24 MMOL/L (ref 21–32)
COLOR UR: YELLOW
CREAT SERPL-MCNC: 0.68 MG/DL (ref 0.6–1.3)
EOSINOPHIL # BLD AUTO: 0.06 THOUSAND/ΜL (ref 0–0.61)
EOSINOPHIL NFR BLD AUTO: 1 % (ref 0–6)
ERYTHROCYTE [DISTWIDTH] IN BLOOD BY AUTOMATED COUNT: 13.8 % (ref 11.6–15.1)
GFR SERPL CREATININE-BSD FRML MDRD: 82 ML/MIN/1.73SQ M
GLUCOSE SERPL-MCNC: 99 MG/DL (ref 65–140)
GLUCOSE UR STRIP-MCNC: NEGATIVE MG/DL
HCT VFR BLD AUTO: 43.8 % (ref 34.8–46.1)
HGB BLD-MCNC: 14.8 G/DL (ref 11.5–15.4)
HGB UR QL STRIP.AUTO: NEGATIVE
IMM GRANULOCYTES # BLD AUTO: 0.02 THOUSAND/UL (ref 0–0.2)
IMM GRANULOCYTES NFR BLD AUTO: 0 % (ref 0–2)
KETONES UR STRIP-MCNC: NEGATIVE MG/DL
LACTATE SERPL-SCNC: 0.9 MMOL/L (ref 0.5–2)
LEUKOCYTE ESTERASE UR QL STRIP: NEGATIVE
LYMPHOCYTES # BLD AUTO: 1.61 THOUSANDS/ΜL (ref 0.6–4.47)
LYMPHOCYTES NFR BLD AUTO: 20 % (ref 14–44)
MCH RBC QN AUTO: 30.3 PG (ref 26.8–34.3)
MCHC RBC AUTO-ENTMCNC: 33.8 G/DL (ref 31.4–37.4)
MCV RBC AUTO: 90 FL (ref 82–98)
MONOCYTES # BLD AUTO: 0.52 THOUSAND/ΜL (ref 0.17–1.22)
MONOCYTES NFR BLD AUTO: 7 % (ref 4–12)
NEUTROPHILS # BLD AUTO: 5.65 THOUSANDS/ΜL (ref 1.85–7.62)
NEUTS SEG NFR BLD AUTO: 72 % (ref 43–75)
NITRITE UR QL STRIP: NEGATIVE
NRBC BLD AUTO-RTO: 0 /100 WBCS
PH UR STRIP.AUTO: 5.5 [PH] (ref 4.5–8)
PLATELET # BLD AUTO: 258 THOUSANDS/UL (ref 149–390)
PMV BLD AUTO: 9.1 FL (ref 8.9–12.7)
POTASSIUM SERPL-SCNC: 3.9 MMOL/L (ref 3.5–5.3)
PROT SERPL-MCNC: 7.3 G/DL (ref 6.4–8.2)
PROT UR STRIP-MCNC: NEGATIVE MG/DL
RBC # BLD AUTO: 4.89 MILLION/UL (ref 3.81–5.12)
SODIUM SERPL-SCNC: 138 MMOL/L (ref 136–145)
SP GR UR STRIP.AUTO: <=1.005 (ref 1–1.03)
UROBILINOGEN UR QL STRIP.AUTO: 0.2 E.U./DL
WBC # BLD AUTO: 7.89 THOUSAND/UL (ref 4.31–10.16)

## 2018-11-14 PROCEDURE — 85025 COMPLETE CBC W/AUTO DIFF WBC: CPT | Performed by: EMERGENCY MEDICINE

## 2018-11-14 PROCEDURE — 74177 CT ABD & PELVIS W/CONTRAST: CPT

## 2018-11-14 PROCEDURE — 36415 COLL VENOUS BLD VENIPUNCTURE: CPT | Performed by: EMERGENCY MEDICINE

## 2018-11-14 PROCEDURE — 99284 EMERGENCY DEPT VISIT MOD MDM: CPT

## 2018-11-14 PROCEDURE — 81003 URINALYSIS AUTO W/O SCOPE: CPT

## 2018-11-14 PROCEDURE — 96361 HYDRATE IV INFUSION ADD-ON: CPT

## 2018-11-14 PROCEDURE — 80053 COMPREHEN METABOLIC PANEL: CPT | Performed by: EMERGENCY MEDICINE

## 2018-11-14 PROCEDURE — 96360 HYDRATION IV INFUSION INIT: CPT

## 2018-11-14 PROCEDURE — 83605 ASSAY OF LACTIC ACID: CPT | Performed by: EMERGENCY MEDICINE

## 2018-11-14 RX ADMIN — IOHEXOL 100 ML: 350 INJECTION, SOLUTION INTRAVENOUS at 14:28

## 2018-11-14 RX ADMIN — SODIUM CHLORIDE 1000 ML: 0.9 INJECTION, SOLUTION INTRAVENOUS at 13:03

## 2018-11-14 NOTE — ED PROVIDER NOTES
History  Chief Complaint   Patient presents with    Urinary Urgency     pt presents ambulatory with c/o bladder infection x 2 weeks not improved with abx, cold like sx x 2 weeks  c/o urinary urgency and abdominal bloating      Patient is an 68-year-old female who presents to the emergency department relating that she has been treated for urinary tract infections in that she is not any better    On  she was prescribed a 7 day course cephalexin after experiencing urinary frequency and dysuria  A week ago on Monday she saw her PCP and was prescribed ciprofloxacin for 1 week  She relates that since having seen her PCP she developed a bad cold in between    She relates that the last 2 days she had been freezing    She described URI symptoms which today have markedly abated  Over the last few days she has been urinating every 30 minutes  She notes that today she has not been urinating as frequently and that she went 3 hours without voiding  She does note intermittent dysuria  She has not visualized hematuria nor noticed any odor to the urine  She does have intermittent suprapubic pressure  No back discomfort  No known fevers  No nausea or vomiting  She denies any problems with bowel movements  No history of abdominal problems previously  She does have history of chronic low back pain and surgery of the same region  Prior to Admission Medications   Prescriptions Last Dose Informant Patient Reported? Taking? Calcium Carbonate (CALTRATE 600) 1500 (600 Ca) MG TABS   Yes No   Si tablet daily      Facility-Administered Medications: None       Past Medical History:   Diagnosis Date    Osteoporosis        Past Surgical History:   Procedure Laterality Date    BACK SURGERY      FINGER SURGERY         History reviewed  No pertinent family history  I have reviewed and agree with the history as documented      Social History   Substance Use Topics    Smoking status: Never Smoker    Smokeless tobacco: Never Used    Alcohol use No        Review of Systems   All other systems reviewed and are negative  Physical Exam  Physical Exam   Constitutional: She is oriented to person, place, and time  She appears well-developed and well-nourished  Patient very pleasant and anxious  HENT:   Head: Normocephalic  Eyes: Conjunctivae and EOM are normal    Cardiovascular: Normal rate and regular rhythm  Pulmonary/Chest: Effort normal and breath sounds normal    Abdominal: Soft  Bowel sounds are normal  There is no guarding  Mild fullness and tenderness of the suprapubic region  Musculoskeletal: Normal range of motion  Mild right lumbar tenderness  Patient relates this is chronic  She does have healed midline incision   Neurological: She is alert and oriented to person, place, and time  Skin: Skin is warm and dry  Psychiatric: She has a normal mood and affect  Her behavior is normal    Nursing note and vitals reviewed  Vital Signs  ED Triage Vitals [11/14/18 1226]   Temperature Pulse Respirations Blood Pressure SpO2   (!) 97 3 °F (36 3 °C) 91 18 139/76 95 %      Temp Source Heart Rate Source Patient Position - Orthostatic VS BP Location FiO2 (%)   Oral Monitor -- Left arm --      Pain Score       --           Vitals:    11/14/18 1226   BP: 139/76   Pulse: 91       Visual Acuity      ED Medications  Medications   sodium chloride 0 9 % bolus 1,000 mL (0 mL Intravenous Stopped 11/14/18 1500)   iohexol (OMNIPAQUE) 350 MG/ML injection (MULTI-DOSE) 100 mL (100 mL Intravenous Given 11/14/18 1428)       Diagnostic Studies  Results Reviewed     Procedure Component Value Units Date/Time    Lactic acid, plasma [[id number]]  (Normal) Collected:  11/14/18 1303    Lab Status:  Final result Specimen:  Blood from Arm, Right Updated:  11/14/18 1328     LACTIC ACID 0 9 mmol/L     Narrative:         Result may be elevated if tourniquet was used during collection      Comprehensive metabolic panel [[id number]] Collected:  11/14/18 1303    Lab Status:  Final result Specimen:  Blood from Arm, Right Updated:  11/14/18 1325     Sodium 138 mmol/L      Potassium 3 9 mmol/L      Chloride 102 mmol/L      CO2 24 mmol/L      ANION GAP 12 mmol/L      BUN 12 mg/dL      Creatinine 0 68 mg/dL      Glucose 99 mg/dL      Calcium 9 2 mg/dL      AST 29 U/L      ALT 35 U/L      Alkaline Phosphatase 71 U/L      Total Protein 7 3 g/dL      Albumin 3 5 g/dL      Total Bilirubin 0 20 mg/dL      eGFR 82 ml/min/1 73sq m     Narrative:         National Kidney Disease Education Program recommendations are as follows:  GFR calculation is accurate only with a steady state creatinine  Chronic Kidney disease less than 60 ml/min/1 73 sq  meters  Kidney failure less than 15 ml/min/1 73 sq  meters      CBC and differential [[id number]] Collected:  11/14/18 1303    Lab Status:  Final result Specimen:  Blood from Arm, Right Updated:  11/14/18 1311     WBC 7 89 Thousand/uL      RBC 4 89 Million/uL      Hemoglobin 14 8 g/dL      Hematocrit 43 8 %      MCV 90 fL      MCH 30 3 pg      MCHC 33 8 g/dL      RDW 13 8 %      MPV 9 1 fL      Platelets 252 Thousands/uL      nRBC 0 /100 WBCs      Neutrophils Relative 72 %      Immat GRANS % 0 %      Lymphocytes Relative 20 %      Monocytes Relative 7 %      Eosinophils Relative 1 %      Basophils Relative 0 %      Neutrophils Absolute 5 65 Thousands/µL      Immature Grans Absolute 0 02 Thousand/uL      Lymphocytes Absolute 1 61 Thousands/µL      Monocytes Absolute 0 52 Thousand/µL      Eosinophils Absolute 0 06 Thousand/µL      Basophils Absolute 0 03 Thousands/µL     ED Urine Macroscopic [[id number]] Collected:  11/14/18 1306    Lab Status:  Final result Specimen:  Urine Updated:  11/14/18 1311     Color, UA Yellow     Clarity, UA Clear     pH, UA 5 5     Leukocytes, UA Negative     Nitrite, UA Negative     Protein, UA Negative mg/dl      Glucose, UA Negative mg/dl      Ketones, UA Negative mg/dl Urobilinogen, UA 0 2 E U /dl      Bilirubin, UA Negative     Blood, UA Negative     Specific Gravity, UA <=1 005    Narrative:       CLIN[facility]K RESULT                 CT abdomen pelvis with contrast   Final Result by Jorge A Seth MD (11/14 0107)      No acute inflammatory stranding      No evidence of bowel obstruction mildly distended urinary bladder with mild prominence of the both renal pelvises and ureters without any obstructing calculus      Nonobstructing mid left renal calculus, measuring about 5      Small bilateral adnexal cysts measuring 1 7 x 1 6 cm and 1 8 x 1 5 cm  Can be evaluated with ultrasound performed on nonemergent basis         Workstation performed: [id number]                    Procedures  Procedures       Phone Contacts  ED Phone Contact    ED Course                               MDM  Number of Diagnoses or Management Options  Urinary retention:   Urinary urgency:   Diagnosis management comments: Patient reports ongoing urinary symptoms despite 2 recent courses of antibiotics  Given suprapubic tenderness and urgency decision made to brought in evaluation  In addition to urinalysis obtaining basic labs as well as CT scan to look for alternate inflammatory or obstructive process factoring into symptoms  Urinalysis unremarkable  No evidence of UTI  Patient was noted to have some urinary retention as evidenced on CT scan revealing some bladder distention as well as renal pelvis distension  She was retaining less than 200 mL on bladder scan  Discussed with patient following up with Urology  Considered placement of Rodriguez catheter  As patient did note slight decrease in urinary frequency today and noted that she had been taking over-the-counter medications for her URI the possibility of retention as a side effect of this medication was considered  Decision was made to not place a Rodriguez catheter      CritCare Time    Disposition  Final diagnoses:   Urinary retention   Urinary urgency Time reflects when diagnosis was documented in both MDM as applicable and the Disposition within this note     Time User Action Codes Description Comment    11/14/2018  3:58 PM Hernandez Prey A Add [R33 9] Urinary retention     11/14/2018  3:58 PM Hernandez Prey A Add [R39 15] Urinary urgency       ED Disposition     ED Disposition Condition Comment    Discharge  Anaya Lyle discharge to home/self care  Condition at discharge: Good        Follow-up Information     Follow up With Specialties Details Why Contact Info Additional [address] For [facility] Schedule an appointment as soon as possible for a visit For further evaluation/treatment Kathy [address] [place] [phone]  [address] [place] [address] [facility] [place], [address] [place] [address], [phone]          Discharge Medication List as of 11/14/2018  3:59 PM      CONTINUE these medications which have NOT CHANGED    Details   Calcium Carbonate (CALTRATE 600) 1500 (600 Ca) MG TABS 1 tablet daily, Historical Med           No discharge procedures on file      ED Provider  Electronically Signed by           Meryl Demarco MD  11/17/18 1156

## 2018-11-14 NOTE — DISCHARGE INSTRUCTIONS
Acute Urinary Retention in Women   WHAT YOU NEED TO KNOW:   Acute urinary retention (AUR) is when your bladder is full, but you cannot urinate  This condition happens suddenly, gets worse quickly, and lasts a short time  DISCHARGE INSTRUCTIONS:   Medicines:   · Antibiotics  help treat or prevent a bacterial infection  · Take your medicine as directed  Contact your healthcare provider if you think your medicine is not helping or if you have side effects  Tell him if you are allergic to any medicine  Keep a list of the medicines, vitamins, and herbs you take  Include the amounts, and when and why you take them  Bring the list or the pill bottles to follow-up visits  Carry your medicine list with you in case of an emergency  Rodriguez catheter care: You may need a Rodriguez catheter while you are at home  Healthcare providers will give you a smaller leg bag to collect urine  Keep the bag below your waist  This will prevent urine from flowing back into your bladder and causing an infection or other problems  Also, keep the tube free of kinks so the urine will drain properly  Do not pull on the catheter  This can cause pain and bleeding, and may cause the catheter to come out  Ask your healthcare provider for more information on Rodriguez catheter care  Follow up with your healthcare provider as directed:  Write down your questions so you remember to ask them during your visits  Contact your healthcare provider if:   · You have a fever  · You have pain when you urinate  · You see blood in your urine  · You have problems with your catheter  · You have questions or concerns about your condition or care  Return to the emergency department if:   · You have severe abdominal pain  · You are breathing faster than usual     · Your heartbeat is faster than usual     · Your face, hands, feet, or ankles are swollen    © 2017 [facility] Information is for End User's use only and may not be sold, redistributed or otherwise used for commercial purposes  All illustrations and images included in CareNotes® are the copyrighted property of A D A M , Inc  or Maninder Atkinson  The above information is an  only  It is not intended as medical advice for individual conditions or treatments  Talk to your doctor, nurse or pharmacist before following any medical regimen to see if it is safe and effective for you

## 2018-11-15 ENCOUNTER — TELEPHONE (OUTPATIENT)
Dept: UROLOGY | Facility: AMBULATORY SURGERY CENTER | Age: 83
End: 2018-11-15

## 2018-11-15 ENCOUNTER — APPOINTMENT (OUTPATIENT)
Dept: OCCUPATIONAL THERAPY | Facility: CLINIC | Age: 83
End: 2018-11-15
Payer: MEDICARE

## 2018-11-15 NOTE — TELEPHONE ENCOUNTER
Labs and and imaging from ER reviewed  Patient can be scheduled at soonest available new patient appointment  Thank you

## 2018-11-15 NOTE — TELEPHONE ENCOUNTER
Called and spoke to daughter and advised that imaging was reviewed and recommend next available new patient appointment  Offered to schedule on 12/27/18 with DR Mily Camargo NP in [facility] states patient will be traveling on 11/28/18 to [place] and would like her to be seen prior to leaving  Scheduled for NP appt with Kenna Regan on Monday 11/19/18 at 2:30 pm Joaquín Ontiveros per   Instructed to arrive 15 minutes early with photo ID/ Insurance cards

## 2018-11-15 NOTE — TELEPHONE ENCOUNTER
Reason for appointment/Complaint/Diagnosis : urgency,  bladder infection,  Urinary retention     Insurance: [address]  If yes, what kind? Previous urologist?     no                   Records requested/where? In [address] [place] [address] Rd     Outside testing/where? In [facility]     Location Preference for office visit? [facility] or [address] with daughter, Dorita Enriquez  Please call her back

## 2018-11-15 NOTE — TELEPHONE ENCOUNTER
Called spoke to daughter Lavelle aSndhu, work number provided, ok to ask for her directly  Patient had a cold for two weeks was taking Dayquil , Nyquil,  She was treated by PCP for urinary tract infection and completed Cipro last week  She was evaluated in [address] yesterday for complaint of suprapubic pressure and not feeling as if she is adequately emptying her bladder  CT scan done in ER in [facility],  Daughter states she was told it was negative but showed some bladder fullness  Confirmed with daughter that patient is voiding, she has no pain, she has mild suprapubic pressure  She has no fever or chills, cold is resolved  She is no longer taking Dayquil, Nyquil  Advised daughter message will be sent to advanced practitioner to review ER visit and imaging and clinical will call her back with recommendation for scheduling NP appointment  Preferred location is [facility]  Please review and route to [facility]  Thank you

## 2018-11-19 ENCOUNTER — DOCUMENTATION (OUTPATIENT)
Dept: UROLOGY | Facility: AMBULATORY SURGERY CENTER | Age: 83
End: 2018-11-19

## 2018-11-19 ENCOUNTER — OFFICE VISIT (OUTPATIENT)
Dept: UROLOGY | Facility: CLINIC | Age: 83
End: 2018-11-19
Payer: MEDICARE

## 2018-11-19 VITALS
BODY MASS INDEX: 25.87 KG/M2 | HEIGHT: 63 IN | HEART RATE: 96 BPM | WEIGHT: 146 LBS | SYSTOLIC BLOOD PRESSURE: 134 MMHG | DIASTOLIC BLOOD PRESSURE: 80 MMHG

## 2018-11-19 DIAGNOSIS — R33.9 RETENTION OF URINE: Primary | ICD-10-CM

## 2018-11-19 DIAGNOSIS — N20.0 KIDNEY STONES: ICD-10-CM

## 2018-11-19 DIAGNOSIS — R33.9 INCOMPLETE BLADDER EMPTYING: ICD-10-CM

## 2018-11-19 LAB — POST-VOID RESIDUAL VOLUME, ML POC: 91 ML

## 2018-11-19 PROCEDURE — 99203 OFFICE O/P NEW LOW 30 MIN: CPT | Performed by: PHYSICIAN ASSISTANT

## 2018-11-19 PROCEDURE — 51798 US URINE CAPACITY MEASURE: CPT | Performed by: PHYSICIAN ASSISTANT

## 2018-11-19 RX ORDER — DIPHENOXYLATE HYDROCHLORIDE AND ATROPINE SULFATE 2.5; .025 MG/1; MG/1
1 TABLET ORAL
COMMUNITY

## 2018-11-19 RX ORDER — GABAPENTIN 300 MG/1
CAPSULE ORAL
COMMUNITY

## 2018-11-19 RX ORDER — LEVOTHYROXINE SODIUM 0.05 MG/1
50 TABLET ORAL
COMMUNITY

## 2018-11-19 NOTE — PROGRESS NOTES
1  Retention of urine  POCT Measure PVR   2  Kidney stones  US kidney and bladder with pvr   3  Incomplete bladder emptying  US kidney and bladder with pvr    Urine culture       Assessment and plan:       1  Lower urinary tract symptoms  - I was happy to review with the patient that she is demonstrating adequate bladder emptying in the office today with a postvoid residual of 91 mL   -  Reviewed the importance of proper hydration and avoidance of bladder irritants to minimize suprapubic pressure and urinary frequency and urgency  We also discussed the importance of avoidance bowel movements  - patient will follow up in 1 year with a postvoid residual for continued monitoring  She is aware to contact us in the meantime with any signs of urinary infection or worsening urination  Questions answered  Cuong Lindsey PA-C      Chief Complaint     Chief Complaint   Patient presents with    Urinary Urgency    Urinary Retention    Urinary Tract Infection       History of Present Illness     Minh Roche is a 82 y o  female presenting as a new patient for emergency department follow-up  Patient was in the emergency department 11/14/2018 due to a urinary urgency  She had reported that she had urinary infection previously that was treated with antibiotic  She had reported continued urinary urgency and abdominal bloating  Patient's urinalysis in the emergency department was leukocyte, nitrite, and blood negative  Patient's bladder scan in the emergency department was less than 200 mL was discharged home without catheter placement  Patient states that she has a weaker urinary stream   She denies any dysuria, gross hematuria, flank pain, fevers, or chills  She does notice urinary urgency and hesitancy  Patient denies any constipation  Postvoid residual in the office today reveals 91 mL  Patient unable to provide a urine specimen in the office today    Laboratory     Lab Results Component Value Date    CREATININE 0 68 11/14/2018       Review of Systems     Review of Systems   Constitutional: Negative for activity change, appetite change, chills, diaphoresis, fatigue, fever and unexpected weight change  Respiratory: Negative for chest tightness and shortness of breath  Cardiovascular: Negative for chest pain, palpitations and leg swelling  Gastrointestinal: Negative for abdominal distention, abdominal pain, constipation, diarrhea, nausea and vomiting  Genitourinary: Positive for frequency and urgency  Negative for decreased urine volume, difficulty urinating, dysuria, enuresis, flank pain, genital sores and hematuria  Musculoskeletal: Negative for back pain, gait problem and myalgias  Skin: Negative for color change, pallor, rash and wound  Psychiatric/Behavioral: Negative for behavioral problems  The patient is not nervous/anxious  Allergies     Allergies   Allergen Reactions    Doxycycline Hives    Naproxen Hives       Physical Exam     Physical Exam   Constitutional: She is oriented to person, place, and time  She appears well-developed and well-nourished  No distress  HENT:   Head: Normocephalic and atraumatic  Eyes: Conjunctivae are normal    Neck: Normal range of motion  No tracheal deviation present  Pulmonary/Chest: Effort normal    Musculoskeletal: Normal range of motion  She exhibits no edema or deformity  Neurological: She is alert and oriented to person, place, and time  Skin: Skin is warm and dry  She is not diaphoretic  No erythema  No pallor  Psychiatric: She has a normal mood and affect   Her behavior is normal        Vital Signs     Vitals:    11/19/18 1418   BP: 134/80   BP Location: Left arm   Patient Position: Sitting   Cuff Size: Standard   Pulse: 96   Weight: 66 2 kg (146 lb)   Height: 5' 3" (1 6 m)         Current Medications       Current Outpatient Prescriptions:     Calcium Carbonate (CALTRATE 600) 1500 (600 Ca) MG TABS, 1 tablet daily, Disp: , Rfl:     gabapentin (NEURONTIN) 300 mg capsule, prn at night, Disp: , Rfl:     GLUCOSAMINE-CHONDROITIN DS PO, 1 qd, Disp: , Rfl:     levothyroxine 50 mcg tablet, Take 50 mcg by mouth, Disp: , Rfl:     multivitamin (THERAGRAN) TABS, Take 1 tablet by mouth, Disp: , Rfl:       Active Problems     There is no problem list on file for this patient  Past Medical History     Past Medical History:   Diagnosis Date    Osteoporosis      Surgical History     Past Surgical History:   Procedure Laterality Date    BACK SURGERY      FINGER SURGERY         Family History     History reviewed  No pertinent family history      Social History     Social History     Radiology

## 2018-11-20 ENCOUNTER — OFFICE VISIT (OUTPATIENT)
Dept: OCCUPATIONAL THERAPY | Facility: CLINIC | Age: 83
End: 2018-11-20
Payer: MEDICARE

## 2018-11-20 DIAGNOSIS — S62.616D CLOSED DISPLACED FRACTURE OF PROXIMAL PHALANX OF RIGHT LITTLE FINGER WITH ROUTINE HEALING, SUBSEQUENT ENCOUNTER: Primary | ICD-10-CM

## 2018-11-20 PROCEDURE — G8985 CARRY GOAL STATUS: HCPCS

## 2018-11-20 PROCEDURE — 97018 PARAFFIN BATH THERAPY: CPT

## 2018-11-20 PROCEDURE — 97110 THERAPEUTIC EXERCISES: CPT

## 2018-11-20 PROCEDURE — G8986 CARRY D/C STATUS: HCPCS

## 2018-11-20 PROCEDURE — 97140 MANUAL THERAPY 1/> REGIONS: CPT

## 2018-11-20 NOTE — PROGRESS NOTES
Daily Note / Discharge    Today's date: 2018  Patient name: Sherif Peralta  : 1935  MRN: [id number]  Referring provider: Nikolas Ramirez MD  Dx:   Encounter Diagnosis     ICD-10-CM    1  Closed displaced fracture of proximal phalanx of right little finger with routine healing, subsequent encounter S62 [id number]        Subjective: "I am going to [place] on Tuesday "      Objective: See treatment diary below    Specialty Daily Treatment Diary     Manual  10/18/2018 10/23/2018 10/25/2018 10/29/2018 2018   Composite Digit Stretch 30 sec x 6 30 sec x 6 30 sec x 6 30 sec x 6 30 sec x 6   Digit Extension Stretch 30 sec x 6 30 sec x 6 30 sec x 6 30 sec x 6 30 sec x 6   Edema Massage 10 Min 10 Min 10 Min 10 Min 10 Min                       Exercise Diary  10/18/2018 10/23/2018 10/25/2018 10/30/2018 2018   DIP Blocking X 30 X 30 X 30 X 30 X 30   PIP Blocking X 30 X 30 X 30 X 30 X 30   MCP Blocking X 30 X 30 X 30 X 30 X 30   TGE X 10 X 10 X 10 X 10 X 10   Theraputty Grasps Red x 20 Red x 20 Red x 20 Red x 20 Red x 20   Digi-Flex Green x 20 Green x 20 Green x 20 Green x 20 Green x 20   Theraputty Intrinsic Pushes Red x 20 Red x 20 Red x 20 Red x 20 Red x 20   Finger Abd/adduction X 20 X 20 X 20 X 20 X 20   Single Digi-Flex        Theraputty Finger Abduction Red x 20 Red x 20 Red x 20 Red x 20 Red x 20   Theraputty Cone Presses Red x 20 Red x 20 Red x 20 Red x 20 Red x 20   Golf Ball Manipulation X 10 X 10 X 10 X 10 X 10   Clothespin Pinches X 10 X 10 X 10 X 10 X 10   Pinky Abd/Adduction    X 20 X 20   Pinky Place and Hold Digit Ext    X 20 X 20   Hollis Hold Pinky Opposition    10 sec x 5 10 sec x 5                                       Modalities 10/18/2018 10/23/2018 10/25/2018 10/30/2018 2018   Paraffin w/ MH 10 Min 10 Min 10 Min 10 Min 10 Min   CP w/ [facility]        Splint Fabrication   Digit Extension Splint           Assessment: Tolerated treatment well   Patient exhibited good technique with therapeutic exercises and would benefit from continued OT  Patient reports she will be leaving for [place] on 11/27/2018  Patient will not be returning till April  Patient will be discharged at this time  Patient and therapist reviewed final HEP this session  Patient demonstrated proper techniques for all exercises as per plan of care  Patient verbalized understanding of exercises and rationale  All questions and concerns by patient regarding final HEP were answered at this time  Plan: Discharge from OT services at this time with continuation of HEP

## 2019-07-31 ENCOUNTER — TRANSCRIBE ORDERS (OUTPATIENT)
Dept: PHYSICAL THERAPY | Facility: CLINIC | Age: 84
End: 2019-07-31

## 2019-07-31 ENCOUNTER — EVALUATION (OUTPATIENT)
Dept: PHYSICAL THERAPY | Facility: CLINIC | Age: 84
End: 2019-07-31
Payer: MEDICARE

## 2019-07-31 DIAGNOSIS — Z98.890 S/P ORIF (OPEN REDUCTION INTERNAL FIXATION) FRACTURE: Primary | ICD-10-CM

## 2019-07-31 DIAGNOSIS — Z87.81 S/P ORIF (OPEN REDUCTION INTERNAL FIXATION) FRACTURE: Primary | ICD-10-CM

## 2019-07-31 PROCEDURE — 97162 PT EVAL MOD COMPLEX 30 MIN: CPT | Performed by: PHYSICAL THERAPIST

## 2019-07-31 PROCEDURE — 97140 MANUAL THERAPY 1/> REGIONS: CPT | Performed by: PHYSICAL THERAPIST

## 2019-07-31 NOTE — LETTER
2019    Regis Del Real DPM  C/Kiel Celeste Caonilla    Patient: Tyler Garduno   YOB: 1935   Date of Visit: 2019     Encounter Diagnosis     ICD-10-CM    1  S/P ORIF (open reduction internal fixation) fracture Z96 7     Z87 81        Dear Dr Maritza Alcaraz: Thank you for your recent referral of Tyler Garduno  Please review the attached evaluation summary from Anaya's recent visit  Please verify that you agree with the plan of care by signing the attached order  If you have any questions or concerns, please do not hesitate to call  I sincerely appreciate the opportunity to share in the care of one of your patients and hope to have another opportunity to work with you in the near future  Sincerely,    Nyla Virgen, PT      Referring Provider:      I certify that I have read the below Plan of Care and certify the need for these services furnished under this plan of treatment while under my care  Regis Del Real DPM  [address]  [facility] [address]  [facility]  [phone]  [id number]  VIA Facsimile: [phone]          PT Evaluation     Today's date: 2019  Patient name: Tyler Garduno  : 1935  MRN: [id number]  Referring provider: Aj Pickens DPM  Dx:   Encounter Diagnosis     ICD-10-CM    1  S/P ORIF (open reduction internal fixation) fracture Z96 7     Z87 81                   Assessment  Assessment details:   Tyler Garduno is a 82 y o  female who presents to outpatient PT with a  S/P ORIF (open reduction internal fixation) fracture  (primary encounter diagnosis)  No further referral appears necessary at this time based upon examination results  Pt presents with decreased strength, ROM, balance, functional activity tolerance, and pain with movement in her left foot  which is  limiting her ability to perform the aforementioned functional activities  Pt amb into PT without CAM boot this date, and no AD  Pt displays an antalgic gait pattern  Etiologic factors include repetitive poor body mechanics, and recent fall/surgery  Prognosis is good given HEP compliance and PT 2-3/wk  Please contact me if you have any questions or recommendations  Thank you for the opportunity to share in  [place] care  Impairments: abnormal coordination, abnormal gait, abnormal or restricted ROM, abnormal movement, activity intolerance, impaired physical strength, lacks appropriate home exercise program, pain with function and poor posture     Symptom irritability: moderateUnderstanding of Dx/Px/POC: good   Prognosis: good    Goals  1  In 4-6 weeks, patient will demonstrate a decrease in pain to 0/10 during functional activities  2  In 4-6 weeks, patient will demonstrate an increase in range of motion by 5 degrees  3  In 4-6 weeks, patient will demonstrate an increase in strength by 1/2 grade on MMT    1  In 6-8 weeks, patient will be independent with their home exercise program  2  In 6-8 weeks, patient will have zero limitations with strength  3  In 6-8 weeks, patient will have zero limitations with ROM  4  In 6-8 weeks, patient will have zero limitations with ambulation  5  In 6-8 weeks, patient will have zero limitations with stair negotiation        Plan  Patient would benefit from: skilled physical therapy  Planned therapy interventions: manual therapy, therapeutic exercise and home exercise program  Frequency: 2x week  Duration in weeks: 4  Treatment plan discussed with: patient        Subjective Evaluation    History of Present Illness  Date of onset: 3/5/2019  Mechanism of injury: The patient is an 82year old female who presents to outpatient physical therapy S/P ORIF of left foot  Pt reports she was in [facility]  She was wearing high heels, and turned her foot, after she was walking down the stairs  She had a pin placed in her left foot, by a surgeon in [facility]  Came home to [employer], and was seen at  [facility] [place] [address]  Was D/C from her CAM boot, and sent to outpatient physical therapy to help work on Left foot strengthening  Pain  Current pain ratin  At best pain ratin  At worst pain ratin  Quality: dull ache, tight, sharp and discomfort  Aggravating factors: walking, standing and stair climbing  Progression: worsening    Social Support  Steps to enter house: yes  Stairs in house: yes   Lives in: multiple-level home  Lives with: alone    Working: Retired   Hand dominance: right    Treatments  Current treatment: physical therapy  Patient Goals  Patient goals for therapy: increased strength, increased motion and decreased pain  Patient goal: Get Back to normal         Objective     Active Range of Motion   Left Ankle/Foot   Dorsiflexion (ke): 6 degrees   Plantar flexion: 30 degrees   Inversion: 5 degrees   Eversion: 30 degrees     Additional Active Range of Motion Details  Surgical incision(s) inspected  Incision(s) clean, dry and intact with no signs/symptoms of infection  Patient was educated on signs/symptoms of infection and was advised to contact MD immediately if suspect infection          Pain and tenderness to metatarsals 3-5                      Precautions: S/P ORIF Left foot Fracture ( Pin in Left 5th Metatarsal) DOS , WBAT       Manual              PROM to Left Ankle  10'                                                                    Exercise Diary              Ankle TB 4 ways              Baps Board             Towel Inv/Eversion             SLS Balance              Step Up              Mini Squat              Nu-Step              Treadmill Gait Train                                                                                                                                                                              Modalities

## 2019-07-31 NOTE — PROGRESS NOTES
PT Evaluation     Today's date: 2019  Patient name: Tyler Garduno  : 1935  MRN: [id number]  Referring provider: Aj Pickens DPM  Dx:   Encounter Diagnosis     ICD-10-CM    1  S/P ORIF (open reduction internal fixation) fracture Z96 7     Z87 81                   Assessment  Assessment details:   Tyler Garduno is a 82 y o  female who presents to outpatient PT with a  S/P ORIF (open reduction internal fixation) fracture  (primary encounter diagnosis)  No further referral appears necessary at this time based upon examination results  Pt presents with decreased strength, ROM, balance, functional activity tolerance, and pain with movement in her left foot  which is  limiting her ability to perform the aforementioned functional activities  Pt amb into PT without CAM boot this date, and no AD  Pt displays an antalgic gait pattern  Etiologic factors include repetitive poor body mechanics, and recent fall/surgery  Prognosis is good given HEP compliance and PT 2-3/wk  Please contact me if you have any questions or recommendations  Thank you for the opportunity to share in  [place] care  Impairments: abnormal coordination, abnormal gait, abnormal or restricted ROM, abnormal movement, activity intolerance, impaired physical strength, lacks appropriate home exercise program, pain with function and poor posture     Symptom irritability: moderateUnderstanding of Dx/Px/POC: good   Prognosis: good    Goals  1  In 4-6 weeks, patient will demonstrate a decrease in pain to 0/10 during functional activities  2  In 4-6 weeks, patient will demonstrate an increase in range of motion by 5 degrees  3  In 4-6 weeks, patient will demonstrate an increase in strength by 1/2 grade on MMT    1  In 6-8 weeks, patient will be independent with their home exercise program  2  In 6-8 weeks, patient will have zero limitations with strength  3  In 6-8 weeks, patient will have zero limitations with ROM  4    In 6-8 weeks, patient will have zero limitations with ambulation  5  In 6-8 weeks, patient will have zero limitations with stair negotiation        Plan  Patient would benefit from: skilled physical therapy  Planned therapy interventions: manual therapy, therapeutic exercise and home exercise program  Frequency: 2x week  Duration in weeks: 4  Treatment plan discussed with: patient        Subjective Evaluation    History of Present Illness  Date of onset: 3/5/2019  Mechanism of injury: The patient is an 82year old female who presents to outpatient physical therapy S/P ORIF of left foot  Pt reports she was in [facility] [place]  She was wearing high heels, and turned her foot, after she was walking down the stairs  She had a pin placed in her left foot, by a surgeon in [place]  Came home to [employer], and was seen at [facility]  Was D/C from her [facility] boot, and sent to outpatient physical therapy to help work on Left foot strengthening  Pain  Current pain ratin  At best pain ratin  At worst pain ratin  Quality: dull ache, tight, sharp and discomfort  Aggravating factors: walking, standing and stair climbing  Progression: worsening    Social Support  Steps to enter house: yes  Stairs in house: yes   Lives in: multiple-level home  Lives with: alone    Working: Retired   Hand dominance: right    Treatments  Current treatment: physical therapy  Patient Goals  Patient goals for therapy: increased strength, increased motion and decreased pain  Patient goal: Get Back to normal         Objective     Active Range of Motion   Left Ankle/Foot   Dorsiflexion (ke): 6 degrees   Plantar flexion: 30 degrees   Inversion: 5 degrees   Eversion: 30 degrees     Additional Active Range of Motion Details  Surgical incision(s) inspected  Incision(s) clean, dry and intact with no signs/symptoms of infection  Patient was educated on signs/symptoms of infection and was advised to contact MD immediately if suspect infection          Pain and tenderness to metatarsals 3-5                      Precautions: S/P ORIF Left foot Fracture ( Pin in Left 5th Metatarsal) DOS 5/14, WBAT 9/2      Manual  7/31            PROM to Left Ankle  10'                                                                    Exercise Diary              Ankle TB 4 ways              Baps Board             Towel Inv/Eversion             SLS Balance              Step Up              Mini Squat              Nu-Step              Treadmill Gait Train                                                                                                                                                                              Modalities

## 2019-08-02 ENCOUNTER — OFFICE VISIT (OUTPATIENT)
Dept: PHYSICAL THERAPY | Facility: CLINIC | Age: 84
End: 2019-08-02
Payer: MEDICARE

## 2019-08-02 DIAGNOSIS — Z98.890 S/P ORIF (OPEN REDUCTION INTERNAL FIXATION) FRACTURE: Primary | ICD-10-CM

## 2019-08-02 DIAGNOSIS — Z87.81 S/P ORIF (OPEN REDUCTION INTERNAL FIXATION) FRACTURE: Primary | ICD-10-CM

## 2019-08-02 PROCEDURE — 97110 THERAPEUTIC EXERCISES: CPT | Performed by: PHYSICAL THERAPIST

## 2019-08-02 PROCEDURE — 97140 MANUAL THERAPY 1/> REGIONS: CPT | Performed by: PHYSICAL THERAPIST

## 2019-08-02 NOTE — PROGRESS NOTES
Daily Note     Today's date: 2019  Patient name: Geneva Lindsay  : 1935  MRN: [id number]  Referring provider: Rufus Baer DPM  Dx:   Encounter Diagnosis     ICD-10-CM    1  S/P ORIF (open reduction internal fixation) fracture Z96 7     Z87 81                   Subjective: "I am doing good "       Objective: See treatment diary below    Precautions: S/P ORIF Left foot Fracture ( Pin in Left 5th Metatarsal) DOS , WBAT     Manual              PROM to Left Ankle  10' 10'                                                                   Exercise Diary              Ankle TB 4 ways   Red 5''20x            Baps Board  PF DF, Inv, Ev, CW, CCW            Towel Inv/Eversion  2 min            SLS Balance   30''3x B/L            Step Up   B 2x10 B/L           Mini Squat              Nu-Step   10' L3 x 10 min            Treadmill Gait Train                                                                                                                                                                              Modalities                                                         Assessment: Tolerated treatment well  Patient exhibited good technique with therapeutic exercises and would benefit from continued PT      Plan: Continue per plan of care

## 2019-08-06 ENCOUNTER — OFFICE VISIT (OUTPATIENT)
Dept: PHYSICAL THERAPY | Facility: CLINIC | Age: 84
End: 2019-08-06
Payer: MEDICARE

## 2019-08-06 DIAGNOSIS — Z87.81 S/P ORIF (OPEN REDUCTION INTERNAL FIXATION) FRACTURE: Primary | ICD-10-CM

## 2019-08-06 DIAGNOSIS — Z98.890 S/P ORIF (OPEN REDUCTION INTERNAL FIXATION) FRACTURE: Primary | ICD-10-CM

## 2019-08-06 PROCEDURE — 97140 MANUAL THERAPY 1/> REGIONS: CPT

## 2019-08-06 PROCEDURE — 97110 THERAPEUTIC EXERCISES: CPT

## 2019-08-06 NOTE — PROGRESS NOTES
Daily Note     Today's date: 2019  Patient name: Heidi Doll  : 1935  MRN: [id number]  Referring provider: Major Villegas DPM  Dx:   Encounter Diagnosis     ICD-10-CM    1  S/P ORIF (open reduction internal fixation) fracture Z96 7     Z87 81                   Subjective: Pt c/o 4/10 L ankle/foot pain today  Objective: See treatment diary below      Assessment: Tolerated treatment well  Issued Tband and handouts for HEP  Patient exhibited good technique with therapeutic exercises and would benefit from continued PT with occasional verbal cues  Plan: Continue per plan of care             Precautions: S/P ORIF Left foot Fracture ( Pin in Left 5th Metatarsal) DOS , WBAT     Manual       PROM to Left Ankle  10' 10' 10'                                         Exercise Diary        Ankle TB 4 ways   Red 5''20x  Red  5" x20     Baps Board  PF DF, Inv, Ev, CW, CCW  PF,DF  INV,EV  CW,  CCW     Towel Inv/Eversion  2 min  2 min     SLS Balance   30''3x B/L  30"x3  B/L     Step Up   B 2x10 B/L B 2x10  B/L     Mini Squat         Nu-Step   10' L3 x 10 min  L3 x10'     Treadmill Gait Train                                                                                                             Modalities

## 2019-08-08 ENCOUNTER — OFFICE VISIT (OUTPATIENT)
Dept: PHYSICAL THERAPY | Facility: CLINIC | Age: 84
End: 2019-08-08
Payer: MEDICARE

## 2019-08-08 DIAGNOSIS — Z98.890 S/P ORIF (OPEN REDUCTION INTERNAL FIXATION) FRACTURE: Primary | ICD-10-CM

## 2019-08-08 DIAGNOSIS — Z87.81 S/P ORIF (OPEN REDUCTION INTERNAL FIXATION) FRACTURE: Primary | ICD-10-CM

## 2019-08-08 PROCEDURE — 97140 MANUAL THERAPY 1/> REGIONS: CPT

## 2019-08-08 PROCEDURE — 97110 THERAPEUTIC EXERCISES: CPT

## 2019-08-08 NOTE — PROGRESS NOTES
Daily Note     Today's date: 2019  Patient name: Feliberto Nice  : 1935  MRN: [id number]  Referring provider: Brannon Jessica DPM  Dx:   Encounter Diagnosis     ICD-10-CM    1  S/P ORIF (open reduction internal fixation) fracture Z96 7     Z87 81                   Subjective: "I feel okay today " Pt denies pain at rest       Objective: See treatment diary below  Precautions: S/P ORIF Left foot Fracture ( Pin in Left 5th Metatarsal) DOS , WBAT     Manual      PROM to Left Ankle  10' 10' 10' 10'                                        Exercise Diary       Ankle TB 4 ways   Red 5''20x  Red  5" x20 Red  5" x20    Baps Board  PF DF, Inv, Ev, CW, CCW  PF,DF  INV,EV  CW,  CCW PF,DF  INV,EV  CW,  CCW x30 ea    Towel Inv/Eversion  2 min  2 min 2 min ea    Toe curls    2 min    SLS Balance   30''3x B/L  30"x3  B/L 30"x3  B/L    Step Up   B 2x10 B/L B 2x10  B/L B 2x10  B/L    Mini Squat         Nu-Step   10' L3 x 10 min  L3 x10' L4 x10'    Treadmill Gait Train         HR/TR    x30 ea                                                                                                Modalities                                                         Assessment: Tolerated treatment well  Initiated toe curls and HR/TR to strengthen the L foot and ankle region  No exacerbation of symptoms this date  Patient exhibited good technique with therapeutic exercises and would benefit from continued PT      Plan: Continue per plan of care

## 2019-08-13 ENCOUNTER — OFFICE VISIT (OUTPATIENT)
Dept: PHYSICAL THERAPY | Facility: CLINIC | Age: 84
End: 2019-08-13
Payer: MEDICARE

## 2019-08-13 DIAGNOSIS — Z98.890 S/P ORIF (OPEN REDUCTION INTERNAL FIXATION) FRACTURE: Primary | ICD-10-CM

## 2019-08-13 DIAGNOSIS — Z87.81 S/P ORIF (OPEN REDUCTION INTERNAL FIXATION) FRACTURE: Primary | ICD-10-CM

## 2019-08-13 PROCEDURE — 97110 THERAPEUTIC EXERCISES: CPT

## 2019-08-13 PROCEDURE — 97140 MANUAL THERAPY 1/> REGIONS: CPT

## 2019-08-13 NOTE — PROGRESS NOTES
Daily Note     Today's date: 2019  Patient name: Elvia Khanna  : 1935  MRN: [id number]  Referring provider: Hector Corral DPM  Dx:   Encounter Diagnosis     ICD-10-CM    1  S/P ORIF (open reduction internal fixation) fracture Z96 7     Z87 81                   Subjective: Pt denies L foot pain at present, states she gets dorsal L foot pain at night  States she  feels she is walking bent over from having used crutches for so long, but experiences LBP when she stands up straight  Objective: See treatment diary below      Assessment: Tolerated treatment well  Minimal edema L ankle, pt continues to wear support stocking for edema control  Patient exhibited good technique with therapeutic exercises and would benefit from continued PT to increase strength and ROM L ankle  Plan: Continue per plan of care        Precautions: S/P ORIF Left foot Fracture ( Pin in Left 5th Metatarsal) DOS , WBAT     Manual     PROM to Left Ankle  10' 10' 10' 10' x10'                                       Exercise Diary      Ankle TB 4 ways   Red 5''20x  Red  5" x20 Red  5" x20 Green  5" x20   Baps Board  PF DF, Inv, Ev, CW, CCW  PF,DF  INV,EV  CW,  CCW PF,DF  INV,EV  CW,  CCW x30 ea PF,PF,INV,EV,CW,CCW     Towel Inv/Eversion  2 min  2 min 2 min ea 2 min   Toe curls    2 min 2min   SLS Balance   30''3x B/L  30"x3  B/L 30"x3  B/L np   Step Up   B 2x10 B/L B 2x10  B/L B 2x10  B/L np   Mini Squat         Nu-Step   10' L3 x 10 min  L3 x10' L4 x10' L4 x10'   Treadmill Gait Train         HR/TR    x30 ea Seated                                                                                                 Modalities

## 2019-08-15 ENCOUNTER — OFFICE VISIT (OUTPATIENT)
Dept: PHYSICAL THERAPY | Facility: CLINIC | Age: 84
End: 2019-08-15
Payer: MEDICARE

## 2019-08-15 DIAGNOSIS — Z98.890 S/P ORIF (OPEN REDUCTION INTERNAL FIXATION) FRACTURE: Primary | ICD-10-CM

## 2019-08-15 DIAGNOSIS — Z87.81 S/P ORIF (OPEN REDUCTION INTERNAL FIXATION) FRACTURE: Primary | ICD-10-CM

## 2019-08-15 PROCEDURE — 97140 MANUAL THERAPY 1/> REGIONS: CPT | Performed by: PHYSICAL THERAPIST

## 2019-08-15 PROCEDURE — 97150 GROUP THERAPEUTIC PROCEDURES: CPT | Performed by: PHYSICAL THERAPIST

## 2019-08-15 NOTE — PROGRESS NOTES
Daily Note     Today's date: 8/15/2019  Patient name: Tyson Ochoa  : 1935  MRN: [id number]  Referring provider: Sherry Hernandez DPM  Dx:   Encounter Diagnosis     ICD-10-CM    1  S/P ORIF (open reduction internal fixation) fracture Z96 7     Z87 81                   Subjective: "Yesterday was a bad day but today is better "      Objective: See treatment diary below      Assessment: Tolerated treatment well  Recommended patient continue with compression sock but to wean out and monitor response  Recommended she continue to wear supportive shoes (sneakers)  Patient demonstrated fatigue post treatment, exhibited good technique with therapeutic exercises and would benefit from continued PT      Plan: Continue per plan of care  Progress treatment as tolerated         Precautions: S/P ORIF Left foot Fracture ( Pin in Left 5th Metatarsal) DOS , WBAT     Manual  8/15 8/2  8/6 8/8 8/13   PROM to Left Ankle  10' 10' 10' 10' x10'                                       Exercise Diary  8/15 8/2 8/6 8/8 8/13   Ankle TB 4 ways  Red 20x 5"  resume green TB NV Red 5''20x  Red  5" x20 Red  5" x20 Green  5" x20   Baps Board PF,DF  INV,EV  CW,  CCW x30 ea PF DF, Inv, Ev, CW, CCW  PF,DF  INV,EV  CW,  CCW PF,DF  INV,EV  CW,  CCW x30 ea PF,PF,INV,EV,CW,CCW     Towel Inv/Eversion 2 min ea 2 min  2 min 2 min ea 2 min   Toe curls 2 min   2 min 2min   SLS Balance  30"x3  B/L 30''3x B/L  30"x3  B/L 30"x3  B/L np   Step Up  B 2x10 B/L B 2x10 B/L B 2x10  B/L B 2x10  B/L np   Mini Squat         Nu-Step  L4 x10' 10' L3 x 10 min  L3 x10' L4 x10' L4 x10'   Treadmill Gait Train         HR/TR x30 seated   x30 ea Seated

## 2019-08-20 ENCOUNTER — OFFICE VISIT (OUTPATIENT)
Dept: PHYSICAL THERAPY | Facility: CLINIC | Age: 84
End: 2019-08-20
Payer: MEDICARE

## 2019-08-20 DIAGNOSIS — Z98.890 S/P ORIF (OPEN REDUCTION INTERNAL FIXATION) FRACTURE: Primary | ICD-10-CM

## 2019-08-20 DIAGNOSIS — Z87.81 S/P ORIF (OPEN REDUCTION INTERNAL FIXATION) FRACTURE: Primary | ICD-10-CM

## 2019-08-20 PROCEDURE — 97110 THERAPEUTIC EXERCISES: CPT

## 2019-08-20 PROCEDURE — 97140 MANUAL THERAPY 1/> REGIONS: CPT

## 2019-08-20 NOTE — PROGRESS NOTES
Daily Note     Today's date: 2019  Patient name: Lety King  : 1935  MRN: [id number]  Referring provider: Inocente Arriaga DPM  Dx:   Encounter Diagnosis     ICD-10-CM    1  S/P ORIF (open reduction internal fixation) fracture Z96 7     Z87 81                   Subjective: Pt frustrated, "I'm impatient, wish I was walking better"  I just feel like my B legs feel so tight and weak since my injury  States she  has difficulty returning to sit without flopping in chair  Objective: See treatment diary below      Assessment: Tolerated treatment well  Patient exhibited good technique with therapeutic exercises and would benefit from continued PT      Plan: Continue per plan of care        Precautions: S/P ORIF Left foot Fracture ( Pin in Left 5th Metatarsal) DOS , WBAT ,     Manual  8/15 8/20 8/6 8/8 8/13   PROM to Left Ankle  10' 10' 10' 10' x10'                                       Exercise Diary  8/15 8/20 8/6 8/8 8/13   Ankle TB 4 ways  Red 20x 5"  resume green TB NV Green  20 x5" Red  5" x20 Red  5" x20 Green  5" x20   Baps Board PF,DF  INV,EV  CW,  CCW x30 ea PF/DF  INV/EV  CW,  CCW  x30 ea PF,DF  INV,EV  CW,  CCW PF,DF  INV,EV  CW,  CCW x30 ea PF,PF,INV,EV,CW,CCW     Towel Inv/Eversion 2 min ea 2min ea 2 min 2 min ea 2 min   Toe curls 2 min 2 min  2 min 2min   SLS Balance  30"x3  B/L 30" x3  B/L 30"x3  B/L 30"x3  B/L np   Step Up  B 2x10 B/L "B" 2x10   B/L B 2x10  B/L B 2x10  B/L np   Mini Squat   5" x10      Nu-Step  L4 x10' L4 x10 ' L3 x10' L4 x10' L4 x10'   Treadmill Gait Train         HR/TR x30 seated Stand  x20  x30 ea Seated  x30   Leg press  60# x30

## 2019-08-22 ENCOUNTER — OFFICE VISIT (OUTPATIENT)
Dept: PHYSICAL THERAPY | Facility: CLINIC | Age: 84
End: 2019-08-22
Payer: MEDICARE

## 2019-08-22 DIAGNOSIS — Z98.890 S/P ORIF (OPEN REDUCTION INTERNAL FIXATION) FRACTURE: Primary | ICD-10-CM

## 2019-08-22 DIAGNOSIS — Z87.81 S/P ORIF (OPEN REDUCTION INTERNAL FIXATION) FRACTURE: Primary | ICD-10-CM

## 2019-08-22 PROCEDURE — 97140 MANUAL THERAPY 1/> REGIONS: CPT | Performed by: PHYSICAL THERAPIST

## 2019-08-22 PROCEDURE — 97150 GROUP THERAPEUTIC PROCEDURES: CPT | Performed by: PHYSICAL THERAPIST

## 2019-08-22 NOTE — PROGRESS NOTES
Daily Note     Today's date: 2019  Patient name: Melissa Unger  : 1935  MRN: [id number]  Referring provider: Aleena Richardson DPM  Dx:   Encounter Diagnosis     ICD-10-CM    1  S/P ORIF (open reduction internal fixation) fracture Z96 7     Z87 81                   Subjective:  " I think the foot is getting better, I am going away next week so I wont be here "       Objective: See treatment diary below      Assessment: Tolerated treatment well  Patient exhibited good technique with therapeutic exercises and would benefit from continued PT      Plan: Continue per plan of care        Precautions: S/P ORIF Left foot Fracture ( Pin in Left 5th Metatarsal) DOS , WBAT ,     Manual  8/15 8/20 8/22 8/8 8/13   PROM to Left Ankle  10' 10' 10' 10' x10'                                       Exercise Diary  8/15 8/20 8/22 8/8 8/13   Ankle TB 4 ways  Red 20x 5"  resume green TB NV Green  20 x5" Green   5" x20 Red  5" x20 Green  5" x20   Baps Board PF,DF  INV,EV  CW,  CCW x30 ea PF/DF  INV/EV  CW,  CCW  x30 ea PF,DF  INV,EV  CW,  CCW PF,DF  INV,EV  CW,  CCW x30 ea PF,PF,INV,EV,CW,CCW     Towel Inv/Eversion 2 min ea 2min ea 2 min 2 min ea 2 min   Toe curls 2 min 2 min  2 min 2min   SLS Balance  30"x3  B/L 30" x3  B/L 30"x3  B/L 30"x3  B/L np   Step Up  B 2x10 B/L "B" 2x10   B/L B 2x10  B/L B 2x10  B/L np   Mini Squat   5" x10      Nu-Step  L4 x10' L4 x10 ' L3 x10' L4 x10' L4 x10'   Treadmill Gait Train         HR/TR x30 seated Stand  x20 Stand x20  x30 ea Seated  x30   Leg press  60# x30 60#  x30

## 2019-08-27 ENCOUNTER — APPOINTMENT (OUTPATIENT)
Dept: PHYSICAL THERAPY | Facility: CLINIC | Age: 84
End: 2019-08-27
Payer: MEDICARE

## 2019-08-29 ENCOUNTER — APPOINTMENT (OUTPATIENT)
Dept: PHYSICAL THERAPY | Facility: CLINIC | Age: 84
End: 2019-08-29
Payer: MEDICARE

## 2019-09-03 ENCOUNTER — OFFICE VISIT (OUTPATIENT)
Dept: PHYSICAL THERAPY | Facility: CLINIC | Age: 84
End: 2019-09-03
Payer: MEDICARE

## 2019-09-03 DIAGNOSIS — Z87.81 S/P ORIF (OPEN REDUCTION INTERNAL FIXATION) FRACTURE: Primary | ICD-10-CM

## 2019-09-03 DIAGNOSIS — Z98.890 S/P ORIF (OPEN REDUCTION INTERNAL FIXATION) FRACTURE: Primary | ICD-10-CM

## 2019-09-03 PROCEDURE — 97140 MANUAL THERAPY 1/> REGIONS: CPT

## 2019-09-03 PROCEDURE — 97110 THERAPEUTIC EXERCISES: CPT

## 2019-09-03 NOTE — PROGRESS NOTES
Daily Note     Today's date: 9/3/2019  Patient name: Kasandra Vazquez  : 1935  MRN: [id number]  Referring provider: Jim Acosta DPM  Dx:   Encounter Diagnosis     ICD-10-CM    1  S/P ORIF (open reduction internal fixation) fracture Z96 7     Z87 81                   Subjective: "I was away for awhile  The break from working out made me I feel pretty good "      Objective: See treatment diary below      Assessment: Tolerated treatment well  Increased weight on the leg press and initiated an eccentric heel/toe raise on the leg press to strengthen the LLE and ankle  Patient exhibited good technique with therapeutic exercises and would benefit from continued PT  Plan: Continue per plan of care        Precautions: S/P ORIF Left foot Fracture ( Pin in Left 5th Metatarsal) DOS , WBAT ,     Manual  8/15 8/20 8/22 9/3 8/13   PROM to Left Ankle  10' 10' 10' 10' x10'                                       Exercise Diary  8/15 8/20 8/22 9/3 8/13   Ankle TB 4 ways  Red 20x 5"  resume green TB NV Green  20 x5" Green   5" x20 Green  5" x20 Green  5" x20   Baps Board PF,DF  INV,EV  CW,  CCW x30 ea PF/DF  INV/EV  CW,  CCW  x30 ea PF,DF  INV,EV  CW,  CCW PF,DF  INV,EV  CW,  CCW x30 ea PF,PF,INV,EV,CW,CCW     Towel Inv/Eversion 2 min ea 2 min ea 2 min 2 min ea 2 min   Toe curls 2 min 2 min  2 min 2min   SLS Balance  30"x3  B/L 30" x3  B/L 30"x3  B/L 30"x3  B/L np   Step Up  B 2x10 B/L "B" 2x10   B/L B 2x10  B/L B 2x10  B/L np   Mini Squat   5" x10      Nu-Step  L4 x10' L4 x10 ' L3 x10' [address] [facility] L5 x10' L4 x10'   Treadmill Gait Train         HR/TR x30 seated Stand  x20 Stand x20  80# on Leg Press Seated  x30   Leg press  60# x30 60#  x30 80# 3x10

## 2019-09-05 ENCOUNTER — EVALUATION (OUTPATIENT)
Dept: PHYSICAL THERAPY | Facility: CLINIC | Age: 84
End: 2019-09-05
Payer: MEDICARE

## 2019-09-05 ENCOUNTER — TRANSCRIBE ORDERS (OUTPATIENT)
Dept: PHYSICAL THERAPY | Facility: CLINIC | Age: 84
End: 2019-09-05

## 2019-09-05 DIAGNOSIS — Z87.81 S/P ORIF (OPEN REDUCTION INTERNAL FIXATION) FRACTURE: Primary | ICD-10-CM

## 2019-09-05 DIAGNOSIS — Z98.890 S/P ORIF (OPEN REDUCTION INTERNAL FIXATION) FRACTURE: Primary | ICD-10-CM

## 2019-09-05 PROCEDURE — 97110 THERAPEUTIC EXERCISES: CPT | Performed by: PHYSICAL THERAPIST

## 2019-09-05 PROCEDURE — 97164 PT RE-EVAL EST PLAN CARE: CPT | Performed by: PHYSICAL THERAPIST

## 2019-09-05 NOTE — LETTER
September 5, 2019    Dany Cody DPM  C/Kiel Celeste Caonilla    Patient: Melissa Unger   YOB: 1935   Date of Visit: 9/5/2019     Encounter Diagnosis     ICD-10-CM    1  S/P ORIF (open reduction internal fixation) fracture Z96 7     Z87 81        Dear Dr Ellison Severance: Thank you for your recent referral of Melissa Unger  Please review the attached evaluation summary from Anaya's recent visit  Please verify that you agree with the plan of care by signing the attached order  If you have any questions or concerns, please do not hesitate to call  I sincerely appreciate the opportunity to share in the care of one of your patients and hope to have another opportunity to work with you in the near future  Sincerely,    Latonya Barboza, PT      Referring Provider:      I certify that I have read the below Plan of Care and certify the need for these services furnished under this plan of treatment while under my care  Dany Cody DPM  [facility]: [phone]          PT Re-Evaluation    Assessment  Assessment details:     Melissa Unger has demonstrated decreased pain, increased strength, increased range of motion, and increased activity tolerance since starting physical therapy services  They report an overall improvement of 70% thus far  Pt ambulates with a forward flexed posture, and an antalgic gait pattern  Displays a shortened step length, on her right lower extremity  Left ankle AROM, has improved in all planes  Left ankle MMT has also improved in all planes  She continues to present with pain, decreased strength, decreased range of motion, and decreased activity tolerance and would benefit from additional skilled physical therapy interventions to address impairments and maximize function              Impairments: abnormal coordination, abnormal gait, abnormal or restricted ROM, abnormal movement, activity intolerance, impaired physical strength, lacks appropriate home exercise program, pain with function and poor posture     Symptom irritability: moderateUnderstanding of Dx/Px/POC: good   Prognosis: good    Goals  1  In 4-6 weeks, patient will demonstrate a decrease in pain to 0/10 during functional activities  Met   2  In 4-6 weeks, patient will demonstrate an increase in range of motion by 5 degrees  Met   3  In 4-6 weeks, patient will demonstrate an increase in strength by 1/2 grade on MMT  Met     1  In 6-8 weeks, patient will be independent with their home exercise program  Met   2  In 6-8 weeks, patient will have zero limitations with strength  Met   3  In 6-8 weeks, patient will have zero limitations with ROM  Met   4  In 6-8 weeks, patient will have zero limitations with ambulation  5  In 6-8 weeks, patient will have zero limitations with stair negotiation        Plan  Patient would benefit from: skilled physical therapy  Planned therapy interventions: manual therapy, therapeutic exercise and home exercise program  Frequency: 2x week  Duration in weeks: 4  Treatment plan discussed with: patient        Subjective Evaluation    History of Present Illness  Date of onset: 3/5/2019  Mechanism of injury: The patient is an 82year old female who presents to outpatient physical therapy S/P ORIF of left foot  Pt reports she was in [place]  She was wearing high heels, and turned her foot, after she was walking down the stairs  She had a pin placed in her left foot, by a surgeon in [place]  Came home to [place] [facility], and was seen at [facility]  Was D/C from her [facility] boot, and sent to outpatient physical therapy to help work on Left foot strengthening  RA 9/5     The patient reports an improvement of 70 percent since beginning skilled physical therapy services  She reports mild pain (3/10) in her left foot, while she is walking   Pain is in the area of the incision in her left foot  She reports improvements with sit to stand transfers, and during ambulation  Her biggest issue at this time, is her walking posture  She reports that she needs to take frequent rest breaks, when she walks and stands for prolonged periods of time Pt to continue with skilled PT to help address gait dysfunction, and continue to work on improving strength, ROM, posture, endurance, and functional activity tolerance  Pain                                   9/5                                  Current pain ratin         0  At best pain ratin          0  At worst pain ratin        3  Quality: dull ache, tight, sharp and discomfort  Aggravating factors: walking, standing and stair climbing  Progression: worsening    Social Support                     Steps to enter house: yes  Stairs in house: yes   Lives in: multiple-level home  Lives with: alone    Working: Retired   Hand dominance: right    Treatments  Current treatment: physical therapy  Patient Goals  Patient goals for therapy: increased strength, increased motion and decreased pain  Patient goal: Get Back to normal         Objective     Active Range of Motion   Left Ankle/Foot                                          9/5   Dorsiflexion (ke): 6 degrees                 10 degrees   Plantar flexion: 30 degrees                   60 degrees   Inversion: 5 degrees                               5 degrees   Eversion: 30 degrees                            35 degrees         Strength Left ankle     Plantarflexion                                      4+/5   Dorsiflexion                                         4+/5   Inversion                                              4+/5   Eversion                                              4+/5      Additional Active Range of Motion Details  Surgical incision(s) inspected  Incision(s) clean, dry and intact with no signs/symptoms of infection   Patient was educated on signs/symptoms of infection and was advised to contact MD immediately if suspect infection          Manual  8/15 8/20 8/22 9/3 9/5   PROM to Left Ankle  10' 10' 10' 10' x10'                                       Exercise Diary  8/15 8/20 8/22 9/3 9/5   Ankle TB 4 ways  Red 20x 5"  resume green TB NV Green  20 x5" Green   5" x20 Green  5" x20 Green  5" x20   Baps Board PF,DF  INV,EV  CW,  CCW x30 ea PF/DF  INV/EV  CW,  CCW  x30 ea PF,DF  INV,EV  CW,  CCW PF,DF  INV,EV  CW,  CCW x30 ea PF,PF,INV,EV,CW,CCW     Towel Inv/Eversion 2 min ea 2 min ea 2 min 2 min ea 2 min   Toe curls 2 min 2 min  2 min 2min   SLS Balance  30"x3  B/L 30" x3  B/L 30"x3  B/L 30"x3  B/L np   Step Up  B 2x10 B/L "B" 2x10   B/L B 2x10  B/L B 2x10  B/L np   Mini Squat   5" x10      Nu-Step  L4 x10' L4 x10 ' L3 x10' [address] [facility] L5 x10' L4 x10'   Treadmill Gait Train         HR/TR x30 seated Stand  x20 Stand x20  80# on Leg Press 80# on Leg Press     Leg press  60# x30 60#  x30 80# 3x10 80 # 3x10    Gait Training on Treadmill      10 min 1 3 MPH, Cues   OverGround

## 2019-09-05 NOTE — PROGRESS NOTES
PT Re-Evaluation    Assessment  Assessment details:     Magalis Charles has demonstrated decreased pain, increased strength, increased range of motion, and increased activity tolerance since starting physical therapy services  They report an overall improvement of 70% thus far  Pt ambulates with a forward flexed posture, and an antalgic gait pattern  Displays a shortened step length, on her right lower extremity  Left ankle AROM, has improved in all planes  Left ankle MMT has also improved in all planes  She continues to present with pain, decreased strength, decreased range of motion, and decreased activity tolerance and would benefit from additional skilled physical therapy interventions to address impairments and maximize function  Impairments: abnormal coordination, abnormal gait, abnormal or restricted ROM, abnormal movement, activity intolerance, impaired physical strength, lacks appropriate home exercise program, pain with function and poor posture     Symptom irritability: moderateUnderstanding of Dx/Px/POC: good   Prognosis: good    Goals  1  In 4-6 weeks, patient will demonstrate a decrease in pain to 0/10 during functional activities  Met   2  In 4-6 weeks, patient will demonstrate an increase in range of motion by 5 degrees  Met   3  In 4-6 weeks, patient will demonstrate an increase in strength by 1/2 grade on MMT  Met     1  In 6-8 weeks, patient will be independent with their home exercise program  Met   2  In 6-8 weeks, patient will have zero limitations with strength  Met   3  In 6-8 weeks, patient will have zero limitations with ROM  Met   4  In 6-8 weeks, patient will have zero limitations with ambulation  5    In 6-8 weeks, patient will have zero limitations with stair negotiation        Plan  Patient would benefit from: skilled physical therapy  Planned therapy interventions: manual therapy, therapeutic exercise and home exercise program  Frequency: 2x week  Duration in weeks: 4  Treatment plan discussed with: patient        Subjective Evaluation    History of Present Illness  Date of onset: 3/5/2019  Mechanism of injury: The patient is an 82year old female who presents to outpatient physical therapy S/P ORIF of left foot  [place]  She was wearing high heels, and turned her foot, after she was walking down the stairs  She had a pin placed in her [place], [place]  Came home to [place], and was seen at [facility]  Was D/C from her [facility] boot, and sent to outpatient physical therapy to help work on Left foot strengthening  RA      The patient reports an improvement of 70 percent since beginning skilled physical therapy services  She reports mild pain (3/10) in her left foot, while she is walking  Pain is in the area of the incision in her left foot  She reports improvements with sit to stand transfers, and during ambulation  Her biggest issue at this time, is her walking posture  She reports that she needs to take frequent rest breaks, when she walks and stands for prolonged periods of time Pt to continue with skilled PT to help address gait dysfunction, and continue to work on improving strength, ROM, posture, endurance, and functional activity tolerance         Pain                                                                     Current pain ratin         0  At best pain ratin          0  At worst pain ratin        3  Quality: dull ache, tight, sharp and discomfort  Aggravating factors: walking, standing and stair climbing  Progression: worsening    Social Support                     Steps to enter house: yes  Stairs in house: yes   Lives in: multiple-level home  Lives with: alone    Working: Retired   Hand dominance: right    Treatments  Current treatment: physical therapy  Patient Goals  Patient goals for therapy: increased strength, increased motion and decreased pain  Patient goal: Get Back to normal Objective     Active Range of Motion   Left Ankle/Foot                                          9/5   Dorsiflexion (ke): 6 degrees                 10 degrees   Plantar flexion: 30 degrees                   60 degrees   Inversion: 5 degrees                               5 degrees   Eversion: 30 degrees                            35 degrees         Strength Left ankle     Plantarflexion                                      4+/5   Dorsiflexion                                         4+/5   Inversion                                              4+/5   Eversion                                              4+/5      Additional Active Range of Motion Details  Surgical incision(s) inspected  Incision(s) clean, dry and intact with no signs/symptoms of infection  Patient was educated on signs/symptoms of infection and was advised to contact MD immediately if suspect infection          Manual  8/15 8/20 8/22 9/3 9/5   PROM to Left Ankle  10' 10' 10' 10' x10'                                       Exercise Diary  8/15 8/20 8/22 9/3 9/5   Ankle TB 4 ways  Red 20x 5"  resume green TB NV Green  20 x5" Green   5" x20 Green  5" x20 Green  5" x20   Baps Board PF,DF  INV,EV  CW,  CCW x30 ea PF/DF  INV/EV  CW,  CCW  x30 ea PF,DF  INV,EV  CW,  CCW PF,DF  INV,EV  CW,  CCW x30 ea PF,PF,INV,EV,CW,CCW     Towel Inv/Eversion 2 min ea 2 min ea 2 min 2 min ea 2 min   Toe curls 2 min 2 min  2 min 2min   SLS Balance  30"x3  B/L 30" x3  B/L 30"x3  B/L 30"x3  B/L np   Step Up  B 2x10 B/L "B" 2x10   B/L B 2x10  B/L B 2x10  B/L np   Mini Squat   5" x10      Nu-Step  L4 x10' L4 x10 ' L3 x10' [address] [facility] L5 x10' L4 x10'   Treadmill Gait Train         HR/TR x30 seated Stand  x20 Stand x20  80# on Leg Press 80# on Leg Press     Leg press  60# x30 60#  x30 80# 3x10 80 # 3x10    Gait Training on Treadmill      10 min 1 3 MPH, Cues   OverGround

## 2019-09-10 ENCOUNTER — OFFICE VISIT (OUTPATIENT)
Dept: PHYSICAL THERAPY | Facility: CLINIC | Age: 84
End: 2019-09-10
Payer: MEDICARE

## 2019-09-10 DIAGNOSIS — Z87.81 S/P ORIF (OPEN REDUCTION INTERNAL FIXATION) FRACTURE: Primary | ICD-10-CM

## 2019-09-10 DIAGNOSIS — Z98.890 S/P ORIF (OPEN REDUCTION INTERNAL FIXATION) FRACTURE: Primary | ICD-10-CM

## 2019-09-10 PROCEDURE — 97140 MANUAL THERAPY 1/> REGIONS: CPT

## 2019-09-10 PROCEDURE — 97110 THERAPEUTIC EXERCISES: CPT

## 2019-09-10 NOTE — PROGRESS NOTES
Daily Note     Today's date: 9/10/2019  Patient name: Noemi Ledezma  : 1935  MRN: [id number]  Referring provider: Simone Kan DPM  Dx:   Encounter Diagnosis     ICD-10-CM    1  S/P ORIF (open reduction internal fixation) fracture Z96 7     Z87 81                   Subjective: Pt denies pain L ankle except for "burning" along incision at night  States she tried to wear dress shoe with small heel over weekend but did not feel comfortable  Objective: See treatment diary below      Assessment: Tolerated treatment well  Discussed with patient performing scar massage Incision site  Pt exhibited good technique with therapeutic exercises and would benefit from continued PT  Plan: Continue per plan of care        Precautions: S/P ORIF Left foot Fracture ( Pin in Left 5th Metatarsal) DOS , WBAT ,     Manual  8/15 8/20 8/22 9/3 9/10   PROM to Left Ankle  10' 10' 10' 10' x10'                                       Exercise Diary  8/15 8/20 8/22 9/3 9/10   Ankle TB 4 ways  Red 20x 5"  resume green TB NV Green  20 x5" Green   5" x20 Green  5" x20 Blue   5" x20   Baps Board PF,DF  INV,EV  CW,  CCW x30 ea PF/DF  INV/EV  CW,  CCW  x30 ea PF,DF  INV,EV  CW,  CCW PF,DF  INV,EV  CW,  CCW x30 ea PF,DF,INV,  EV,CW,  CCW  x30 ea   Towel Inv/Eversion 2 min ea 2 min ea 2 min 2 min ea 2 min ea   Toe curls 2 min 2 min  2 min 2 min   SLS Balance  30"x3  B/L 30" x3  B/L 30"x3  B/L 30"x3  B/L 30" x3  B/L   Step Up  B 2x10 B/L "B" 2x10   B/L B 2x10  B/L B 2x10  B/L "B" 2x10  B/L   Mini Squat   5" x10      Nu-Step  L4 x10' L4 x10 ' L3 x10' [address] [facility] [address] L5 x10' [address] [facility] L5 x10'   Treadmill Gait Train      In // bars  Fwd/back  x4 passes ea   HR/TR x30 seated Stand  x20 Stand x20  80# on Leg Press 80# on Leg Press x30   Leg press  60# x30 60#  x30 80# 3x10 80# 3x10

## 2019-09-12 ENCOUNTER — OFFICE VISIT (OUTPATIENT)
Dept: PHYSICAL THERAPY | Facility: CLINIC | Age: 84
End: 2019-09-12
Payer: MEDICARE

## 2019-09-12 DIAGNOSIS — Z98.890 S/P ORIF (OPEN REDUCTION INTERNAL FIXATION) FRACTURE: Primary | ICD-10-CM

## 2019-09-12 DIAGNOSIS — Z87.81 S/P ORIF (OPEN REDUCTION INTERNAL FIXATION) FRACTURE: Primary | ICD-10-CM

## 2019-09-12 PROCEDURE — 97140 MANUAL THERAPY 1/> REGIONS: CPT

## 2019-09-12 PROCEDURE — 97110 THERAPEUTIC EXERCISES: CPT

## 2019-09-12 NOTE — PROGRESS NOTES
Daily Note     Today's date: 2019  Patient name: Suzan Conley  : 1935  MRN: [id number]  Referring provider: Duane Clark DPM  Dx:   Encounter Diagnosis     ICD-10-CM    1  S/P ORIF (open reduction internal fixation) fracture Z96 7     Z87 81                   Subjective: "I feel pretty good  My ankle is better  My back is the thing that is bothering me "      Objective: See treatment diary below      Assessment: Tolerated treatment well  Patient exhibited good technique with therapeutic exercises      Plan: Pt ankle to be DC from skilled pt by DPT        Precautions: S/P ORIF Left foot Fracture ( Pin in Left 5th Metatarsal) DOS , WBAT ,     Manual  9/12 8/20 8/22 9/3 9/10   PROM to Left Ankle  10' 10' 10' 10' x10'                                       Exercise Diary  9/12 8/20 8/22 9/3 9/10   Ankle TB 4 ways  Blue   5" x20 Green  20 x5" Green   5" x20 Green  5" x20 Blue   5" x20   Baps Board PF,DF  INV,EV  CW,  CCW x30 ea PF/DF  INV/EV  CW,  CCW  x30 ea PF,DF  INV,EV  CW,  CCW PF,DF  INV,EV  CW,  CCW x30 ea PF,DF,INV,  EV,CW,  CCW  x30 ea   Towel Inv/Eversion 2 min ea 2 min ea 2 min 2 min ea 2 min ea   Toe curls 2 min 2 min  2 min 2 min   SLS Balance  30"x3  B/L 30" x3  B/L 30"x3  B/L 30"x3  B/L 30" x3  B/L   Step Up  B 2x10 B/L "B" 2x10   B/L B 2x10  B/L B 2x10  B/L "B" 2x10  B/L   Mini Squat   5" x10      Nu-Step  SRC L5 x10 L4 x10 ' L3 x10' [facility] L5 x10' [facility] L5 x10'   Treadmill Gait Train  In // bars  Fwd/back  x4 passes     In // bars  Fwd/back  x4 passes ea   HR/TR 80# on Leg Press x30 Stand  x20 Stand x20  80# on Leg Press 80# on Leg Press x30   Leg press 80# 3x10 60# x30 60#  x30 80# 3x10 80# 3x10

## 2019-09-15 ENCOUNTER — OFFICE VISIT (OUTPATIENT)
Dept: URGENT CARE | Facility: CLINIC | Age: 84
End: 2019-09-15
Payer: MEDICARE

## 2019-09-15 ENCOUNTER — HOSPITAL ENCOUNTER (EMERGENCY)
Facility: HOSPITAL | Age: 84
Discharge: HOME | End: 2019-09-15
Attending: EMERGENCY MEDICINE | Admitting: FAMILY MEDICINE
Payer: MEDICARE

## 2019-09-15 VITALS
TEMPERATURE: 98.3 F | RESPIRATION RATE: 18 BRPM | DIASTOLIC BLOOD PRESSURE: 63 MMHG | OXYGEN SATURATION: 97 % | WEIGHT: 148 LBS | HEIGHT: 63 IN | HEART RATE: 71 BPM | SYSTOLIC BLOOD PRESSURE: 127 MMHG | BODY MASS INDEX: 26.22 KG/M2

## 2019-09-15 VITALS
RESPIRATION RATE: 16 BRPM | OXYGEN SATURATION: 98 % | HEART RATE: 102 BPM | SYSTOLIC BLOOD PRESSURE: 178 MMHG | DIASTOLIC BLOOD PRESSURE: 79 MMHG | TEMPERATURE: 97.7 F

## 2019-09-15 DIAGNOSIS — T63.441A ALLERGIC REACTION TO BEE STING: ICD-10-CM

## 2019-09-15 DIAGNOSIS — T63.441A BEE STING: Primary | ICD-10-CM

## 2019-09-15 DIAGNOSIS — T63.461A ANAPHYLACTIC REACTION TO WASP STING, ACCIDENTAL OR UNINTENTIONAL, INITIAL ENCOUNTER: Primary | ICD-10-CM

## 2019-09-15 PROCEDURE — 96360 HYDRATION IV INFUSION INIT: CPT

## 2019-09-15 PROCEDURE — 99283 EMERGENCY DEPT VISIT LOW MDM: CPT

## 2019-09-15 PROCEDURE — 99213 OFFICE O/P EST LOW 20 MIN: CPT | Performed by: NURSE PRACTITIONER

## 2019-09-15 PROCEDURE — G0463 HOSPITAL OUTPT CLINIC VISIT: HCPCS | Performed by: NURSE PRACTITIONER

## 2019-09-15 RX ORDER — EPINEPHRINE 1 MG/ML
0.3 INJECTION, SOLUTION, CONCENTRATE INTRAVENOUS ONCE
Status: COMPLETED | OUTPATIENT
Start: 2019-09-15 | End: 2019-09-15

## 2019-09-15 RX ORDER — EPINEPHRINE 0.3 MG/.3ML
INJECTION SUBCUTANEOUS
COMMUNITY

## 2019-09-15 RX ADMIN — SODIUM CHLORIDE 1000 ML: 0.9 INJECTION, SOLUTION INTRAVENOUS at 16:26

## 2019-09-15 RX ADMIN — EPINEPHRINE 0.3 MG: 1 INJECTION, SOLUTION, CONCENTRATE INTRAVENOUS at 15:30

## 2019-09-15 NOTE — ED PROVIDER NOTES
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 Taking? Calcium Carbonate (CALTRATE 600) 1500 (600 Ca) MG TABS  Self Yes No   Si tablet daily   EPINEPHrine (EPIPEN) 0 3 mg/0 3 mL SOAJ   Yes No   Sig: epinephrine 0 3 mg/0 3 mL injection, auto-injector   INJECT 0 3 MILLILITER BY INTRAMUSCULAR ROUTE ONCE AS NEEDED FOR ANAPHYLAXIS   GLUCOSAMINE-CHONDROITIN DS PO  Self Yes No   Si qd   gabapentin (NEURONTIN) 300 mg capsule  Self Yes No   Sig: prn at night   levothyroxine 50 mcg tablet  Self Yes No   Sig: Take 50 mcg by mouth   multivitamin (THERAGRAN) TABS  Self Yes No   Sig: Take 1 tablet by mouth      Facility-Administered Medications Last Administration Doses Remaining   EPINEPHrine PF (ADRENALIN) 1 mg/mL injection 0 3 mg 9/15/2019  3:30 PM 0          Past Medical History:   Diagnosis Date    Osteoporosis        Past Surgical History:   Procedure Laterality Date    BACK SURGERY      FINGER SURGERY         History reviewed  No pertinent family history  I have reviewed and agree with the history as documented      Social History     Tobacco Use    Smoking status: Never Smoker    Smokeless tobacco: Never Used   Substance Use Topics    Alcohol use: Yes     Comment: 1 glass of wine with dinner (not everyday)    Drug use: No        Review of Systems   Constitutional: Negative  HENT: Negative  Eyes: Negative  Respiratory: Positive for shortness of breath  Cardiovascular: Negative  Gastrointestinal: Negative  Genitourinary: Negative  Musculoskeletal: Negative  Neurological: Negative  Psychiatric/Behavioral: Negative  Physical Exam  Physical Exam   Constitutional: She is oriented to person, place, and time  She appears well-developed and well-nourished  HENT:   Head: Normocephalic and atraumatic  Nose: Nose normal    Mouth/Throat: Oropharynx is clear and moist  No oropharyngeal exudate  Eyes: Pupils are equal, round, and reactive to light  Conjunctivae and EOM are normal  Right eye exhibits no discharge  Left eye exhibits no discharge  No scleral icterus  Neck: Normal range of motion  Neck supple  Cardiovascular: Normal rate and regular rhythm  Pulmonary/Chest: Effort normal and breath sounds normal  No respiratory distress  She has no wheezes  Abdominal: Soft  Bowel sounds are normal    Lymphadenopathy:     She has no cervical adenopathy  Neurological: She is alert and oriented to person, place, and time  Skin: Skin is warm and dry  Capillary refill takes less than 2 seconds  Psychiatric: She has a normal mood and affect  Her behavior is normal    Nursing note and vitals reviewed        Vital Signs  ED Triage Vitals [09/15/19 1614]   Temperature Pulse Respirations Blood Pressure SpO2   98 3 °F (36 8 °C) 88 18 147/67 97 %      Temp Source Heart Rate Source Patient Position - Orthostatic VS BP Location FiO2 (%)   Temporal Monitor Lying Left arm --      Pain Score       No Pain           Vitals:    09/15/19 1614   BP: 147/67   Pulse: 88   Patient Position - Orthostatic VS: Lying         Visual Acuity      ED Medications  Medications   sodium chloride 0 9 % bolus 1,000 mL (1,000 mL Intravenous New Bag 9/15/19 1626)       Diagnostic Studies  Results Reviewed     None                 No orders to display              Procedures  Procedures       ED Course  ED Course as of Sep 15 1716   Sun Sep 15, 2019   1705 Patient states she is feeling much better requesting to be discharged home  MDM    Disposition  Final diagnoses:   Bee sting   Allergic reaction to bee sting     Time reflects when diagnosis was documented in both MDM as applicable and the Disposition within this note     Time User Action Codes Description Comment    9/15/2019  5:15 PM Mansfield Challenger Add [F09 025C] Bee sting     9/15/2019  5:15 PM Manjinder Challenger Add [T36 493V] Allergic reaction to bee sting       ED Disposition     ED Disposition Condition Date/Time Comment    Discharge Stable Sun Sep 15, 2019  5:15 PM Anaya Lyle discharge to home/self care  Follow-up Information     Follow up With Specialties Details Why Contact Info    [facility] Schedule an appointment as soon as possible for a visit in 2 days If symptoms worsen [address]  [phone]            Patient's Medications   Discharge Prescriptions    No medications on file     No discharge procedures on file      ED Provider  Electronically Signed by           Katie Lacey MD  09/15/19 0641

## 2019-09-15 NOTE — PROGRESS NOTES
[facility]        NAME: Yun Ruiz is a 82 y o  female  : 1935    MRN: [id number]  DATE: September 15, 2019  TIME: 4:05 PM    Assessment and Plan   Anaphylactic reaction to wasp sting, accidental or unintentional, initial encounter [T63 461A]  1  Anaphylactic reaction to wasp sting, accidental or unintentional, initial encounter  EPINEPHrine PF (ADRENALIN) 1 mg/mL injection 0 3 mg    Transfer to other facility     Report called to ER  Patient Instructions     Patient Instructions   Proceed to [facility] ER for monitoring via EMT  Follow up with PCP in 3-5 days  Proceed to  ER if symptoms worsen  Chief Complaint     Chief Complaint   Patient presents with    Bee Sting     Pt reports getting stung by a bee  History of Present Illness       Patient was stung by a wasps  She attempted to use her EpiPen, but was unable to activate the autoinjector  She states she took a small blue pill shortly before coming here since her EpiPen was not working  She initially stated to the nurse that this was Allegra but then stated to EMTs that it was Benadryl from the neighbor      Review of Systems   Review of Systems   HENT: Positive for trouble swallowing  Respiratory: Positive for shortness of breath  Tongue swelling, shortness of breath, throat swelling   All other systems reviewed and are negative          Current Medications       Current Outpatient Medications:     Calcium Carbonate (CALTRATE 600) 1500 (600 Ca) MG TABS, 1 tablet daily, Disp: , Rfl:     EPINEPHrine (EPIPEN) 0 3 mg/0 3 mL SOAJ, epinephrine 0 3 mg/0 3 mL injection, auto-injector  INJECT 0 3 MILLILITER BY INTRAMUSCULAR ROUTE ONCE AS NEEDED FOR ANAPHYLAXIS, Disp: , Rfl:     gabapentin (NEURONTIN) 300 mg capsule, prn at night, Disp: , Rfl:     GLUCOSAMINE-CHONDROITIN DS PO, 1 qd, Disp: , Rfl:     levothyroxine 50 mcg tablet, Take 50 mcg by mouth, Disp: , Rfl:     multivitamin (THERAGRAN) TABS, Take 1 tablet by mouth, Disp: , Rfl:     Current Facility-Administered Medications:     EPINEPHrine PF (ADRENALIN) 1 mg/mL injection 0 3 mg, 0 3 mg, Subcutaneous, Once, BETSY Chris    Current Allergies     Allergies as of 09/15/2019 - Reviewed 09/15/2019   Allergen Reaction Noted    Doxycycline Hives     Naproxen Hives             The following portions of the patient's history were reviewed and updated as appropriate: allergies, current medications, past family history, past medical history, past social history, past surgical history and problem list      Past Medical History:   Diagnosis Date    Osteoporosis        Past Surgical History:   Procedure Laterality Date    BACK SURGERY      FINGER SURGERY         History reviewed  No pertinent family history  Medications have been verified  Objective   BP (!) 178/79   Pulse 102   Temp 97 7 °F (36 5 °C)   Resp 16   SpO2 98%        Physical Exam     Physical Exam   Constitutional: She is oriented to person, place, and time  She appears well-developed and well-nourished  No distress  HENT:   Head: Normocephalic and atraumatic  Mouth/Throat: Uvula is midline and mucous membranes are normal  Uvula swelling present  Posterior oropharyngeal edema present  Patient initially had tongue and uvula edema  Decreasing quickly after 1st dose of epi  Will hold off on additional medication at this   Eyes: Pupils are equal, round, and reactive to light  Neck: Normal range of motion  Neck supple  No JVD present  Cardiovascular: Normal rate, regular rhythm and intact distal pulses  Murmur heard  Systolic murmur is present with a grade of 2/6  Pulmonary/Chest: Effort normal  No respiratory distress  Abdominal: Soft  She exhibits no distension  Musculoskeletal: Normal range of motion  Neurological: She is alert and oriented to person, place, and time  Skin: Skin is warm and dry  Capillary refill takes less than 2 seconds   She is not diaphoretic  Psychiatric: She has a normal mood and affect  Her speech is normal and behavior is normal  Judgment and thought content normal  Cognition and memory are normal    Patient tolerating the epi shot very well--no nausea, no palpitations, etc   Patient expresses some frustration that she cannot just drive her car to hospital and that she has to go to the hospital monitoring, after explanation, she does state her understanding   Nursing note and vitals reviewed

## 2019-09-15 NOTE — PATIENT INSTRUCTIONS
Proceed to [facility], [facility] ER for monitoring via EMT

## 2019-09-17 ENCOUNTER — EVALUATION (OUTPATIENT)
Dept: PHYSICAL THERAPY | Facility: CLINIC | Age: 84
End: 2019-09-17
Payer: MEDICARE

## 2019-09-17 VITALS — DIASTOLIC BLOOD PRESSURE: 69 MMHG | SYSTOLIC BLOOD PRESSURE: 126 MMHG

## 2019-09-17 DIAGNOSIS — M54.16 LUMBAR RADICULOPATHY: Primary | ICD-10-CM

## 2019-09-17 PROCEDURE — 97535 SELF CARE MNGMENT TRAINING: CPT | Performed by: PHYSICAL THERAPIST

## 2019-09-17 PROCEDURE — 97140 MANUAL THERAPY 1/> REGIONS: CPT | Performed by: PHYSICAL THERAPIST

## 2019-09-17 PROCEDURE — 97162 PT EVAL MOD COMPLEX 30 MIN: CPT | Performed by: PHYSICAL THERAPIST

## 2019-09-17 NOTE — PROGRESS NOTES
PT Evaluation     Today's date: 2019  Patient name: Darian Olmos  : 1935  MRN: [id number]  Referring provider: Janet Sherwood  Dx:   Encounter Diagnosis     ICD-10-CM    1  Lumbar radiculopathy M54 16                   Assessment  Assessment details: Darian Olmos is a 82 y o  female who presents with complaints of acute R sided LBP with radiating symptoms into R LE  No further referral appears necessary at this time based upon examination results  Patient presents with mild SIJ dysfunction and responded well to MET  Presents with neural tension sciatic nerve R side and responded well to nerve glides and self stretches  Provided patient with HEP today  Prognosis is good given HEP compliance and PT 2-3x/wk  Please contact me if you have any questions or recommendations  Thank you for the opportunity to share in Anaya's care  Impairments: abnormal or restricted ROM, impaired physical strength, lacks appropriate home exercise program and pain with function  Functional limitations: poor tolerance to standing, poor tolerance to walking, difficulty on stairs, difficulty with sit to stand transfer, difficulty with floor to stand transferUnderstanding of Dx/Px/POC: good   Prognosis: good    Goals  1) In 4 weeks patient will no longer report R LE pain  2) In 4 weeks patient will report LBP <3/10  3) In 4 weeks patient will demonstrate no difficulty with transferring from sit to stand  4) In 4 weeks patient will demonstrate independence with HEP  5) In 4 weeks patient will tolerate standing for >15 mins, tolerate walking at least 1/4 mile       Plan  Patient would benefit from: skilled physical therapy  Referral necessary: No  Planned modality interventions: cryotherapy and thermotherapy: hydrocollator packs  Planned therapy interventions: abdominal trunk stabilization, joint mobilization, manual therapy, massage, neuromuscular re-education, stretching, strengthening, home exercise program, flexibility, functional ROM exercises, postural training and patient education  Frequency: 3x week  Duration in weeks: 4  Plan of Care beginning date: 2019  Plan of Care expiration date: 10/15/2019  Treatment plan discussed with: patient        Subjective Evaluation    History of Present Illness  Mechanism of injury: Patient c/o having acute onset LBP since being on crutches from an ankle/foot injury  The pain began in /July of this year  Pain is primarily R sided, radiates into R LE "burning" all the way to ankle  Pain is intermittent  Pain is better with rest/laying, worse with standing  Denies N/T, just reports "burning"  Denies any change in bowel/bladder  PMH significant for lumbar fusion 2 years ago    Pain  Current pain ratin  At best pain ratin  At worst pain ratin  Location: R side lower back and R LE  Quality: dull ache, burning and radiating  Relieving factors: rest  Aggravating factors: standing    Social Support    Employment status: not working  Exercise history: daily exercise routine      Diagnostic Tests  No diagnostic tests performed  Treatments  Previous treatment: physical therapy and injection treatment (injection last year)  Patient Goals  Patient goals for therapy: decreased pain          Objective     Neurological Testing     Sensation     Lumbar   Left   Intact: light touch    Right   Intact: light touch    Reflexes   Left   Patellar (L4): normal (2+)  Achilles (S1): trace (1+)    Right   Patellar (L4): normal (2+)  Achilles (S1): trace (1+)  Mechanical Assessment    Cervical      Thoracic      Lumbar    Standing flexion: repeated movements   Pain location:no change  Flexion on step, standing: repeated movements  Pain location: no change  Standing extension: repeated movements  Pain location: no change    Strength/Myotome Testing     Left Hip   Planes of Motion   Flexion: 4+  Abduction: 4+  Adduction: 4+  External rotation: 4+  Internal rotation: 4+    Right Hip   Planes of Motion   Flexion: 4+  Abduction: 4+  Adduction: 4+  External rotation: 4+  Internal rotation: 4+    Left Knee   Flexion: 4+  Extension: 5    Right Knee   Flexion: 4+  Extension: 5    Left Ankle/Foot   Dorsiflexion: 5  Plantar flexion: 5    Right Ankle/Foot   Dorsiflexion: 5  Plantar flexion: 5    Tests     Lumbar     Left   Negative slump test      Right   Negative slump test      Right Hip   Positive long sit  Additional Tests Details  Supine to long sit: R short --> long (R posterior rot innominate)             Precautions: h/o lumbar fusion 2017, osteoporosis  Re-eval due 10/15/19  Manual  9/17       MET f/b pubic shotgun x                                           Exercise Diary  9/17       HEP instruction [address], piriformis stretch, sciatic nerve glides       NuStep or Bike        PPT        Bridges        LTR        Sidelying clamshells with TB        Standing hip flexor stretch (low mat table)        Mini squats        Standing hip 3 way                                                                                                            Modalities  9/17       CP post prn                            Patient was provided a home exercise program and demonstrated an understanding of exercises  Patient was advised to stop performing home exercise program if symptoms increase or new complaints developed  Verbal understanding demonstrated regarding home exercise program instructions

## 2019-09-17 NOTE — LETTER
2019    Hattie Nunez MD  [address] [place] [address]    Patient: Lety King   YOB: 1935   Date of Visit: 2019     Encounter Diagnosis     ICD-10-CM    1  Lumbar radiculopathy M54 16        Dear Dr Jessica Yao: Thank you for your recent referral of Lety King  Please review the attached evaluation summary from Anaya's recent visit  Please verify that you agree with the plan of care by signing the attached order  If you have any questions or concerns, please do not hesitate to call  I sincerely appreciate the opportunity to share in the care of one of your patients and hope to have another opportunity to work with you in the near future  Sincerely,    Jama Miller, PT      Referring Provider:      I certify that I have read the below Plan of Care and certify the need for these services furnished under this plan of treatment while under my care  Hattie Nunez MD  [address] [place] [id number]  VIA Facsimile: [phone]          PT Evaluation     Today's date: 2019  Patient name: Lety King  : 1935  MRN: [id number]  Referring provider: Spenser Chacon  Dx:   Encounter Diagnosis     ICD-10-CM    1  Lumbar radiculopathy M54 16                   Assessment  Assessment details: Lety King is a 82 y o  female who presents with complaints of acute R sided LBP with radiating symptoms into R LE  No further referral appears necessary at this time based upon examination results  Patient presents with mild SIJ dysfunction and responded well to MET  Presents with neural tension sciatic nerve R side and responded well to nerve glides and self stretches  Provided patient with HEP today  Prognosis is good given HEP compliance and PT 2-3x/wk  Please contact me if you have any questions or recommendations  Thank you for the opportunity to share in Anaya's care       Impairments: abnormal or restricted ROM, impaired physical strength, lacks appropriate home exercise program and pain with function  Functional limitations: poor tolerance to standing, poor tolerance to walking, difficulty on stairs, difficulty with sit to stand transfer, difficulty with floor to stand transferUnderstanding of Dx/Px/POC: good   Prognosis: good    Goals  1) In 4 weeks patient will no longer report R LE pain  2) In 4 weeks patient will report LBP <3/10  3) In 4 weeks patient will demonstrate no difficulty with transferring from sit to stand  4) In 4 weeks patient will demonstrate independence with HEP  5) In 4 weeks patient will tolerate standing for >15 mins, tolerate walking at least 1/4 mile  Plan  Patient would benefit from: skilled physical therapy  Referral necessary: No  Planned modality interventions: cryotherapy and thermotherapy: hydrocollator packs  Planned therapy interventions: abdominal trunk stabilization, joint mobilization, manual therapy, massage, neuromuscular re-education, stretching, strengthening, home exercise program, flexibility, functional ROM exercises, postural training and patient education  Frequency: 3x week  Duration in weeks: 4  Plan of Care beginning date: 2019  Plan of Care expiration date: 10/15/2019  Treatment plan discussed with: patient        Subjective Evaluation    History of Present Illness  Mechanism of injury: Patient c/o having acute onset LBP since being on crutches from an ankle/foot injury  The pain began in /July of this year  Pain is primarily R sided, radiates into R LE "burning" all the way to ankle  Pain is intermittent  Pain is better with rest/laying, worse with standing  Denies N/T, just reports "burning"  Denies any change in bowel/bladder  PMH significant for lumbar fusion 2 years ago    Pain  Current pain ratin  At best pain ratin  At worst pain ratin  Location: R side lower back and R LE  Quality: dull ache, burning and radiating  Relieving factors: rest  Aggravating factors: standing    Social Support    Employment status: not working  Exercise history: daily exercise routine      Diagnostic Tests  No diagnostic tests performed  Treatments  Previous treatment: physical therapy and injection treatment (injection last year)  Patient Goals  Patient goals for therapy: decreased pain          Objective     Neurological Testing     Sensation     Lumbar   Left   Intact: light touch    Right   Intact: light touch    Reflexes   Left   Patellar (L4): normal (2+)  Achilles (S1): trace (1+)    Right   Patellar (L4): normal (2+)  Achilles (S1): trace (1+)  Mechanical Assessment    Cervical      Thoracic      Lumbar    Standing flexion: repeated movements   Pain location:no change  Flexion on step, standing: repeated movements  Pain location: no change  Standing extension: repeated movements  Pain location: no change    Strength/Myotome Testing     Left Hip   Planes of Motion   Flexion: 4+  Abduction: 4+  Adduction: 4+  External rotation: 4+  Internal rotation: 4+    Right Hip   Planes of Motion   Flexion: 4+  Abduction: 4+  Adduction: 4+  External rotation: 4+  Internal rotation: 4+    Left Knee   Flexion: 4+  Extension: 5    Right Knee   Flexion: 4+  Extension: 5    Left Ankle/Foot   Dorsiflexion: 5  Plantar flexion: 5    Right Ankle/Foot   Dorsiflexion: 5  Plantar flexion: 5    Tests     Lumbar     Left   Negative slump test      Right   Negative slump test      Right Hip   Positive long sit       Additional Tests Details  Supine to long sit: R short --> long (R posterior rot innominate)             Precautions: h/o lumbar fusion 2017, osteoporosis  Re-eval due 10/15/19  Manual  9/17       MET f/b pubic shotgun x                                           Exercise Diary  9/17       HEP instruction [address], piriformis stretch, sciatic nerve glides       NuStep or Bike        PPT        Bridges        LTR        Sidelying clamshells with TB Standing hip flexor stretch (low mat table)        Mini squats        Standing hip 3 way                                                                                                            Modalities  9/17       CP post prn                            Patient was provided a home exercise program and demonstrated an understanding of exercises  Patient was advised to stop performing home exercise program if symptoms increase or new complaints developed  Verbal understanding demonstrated regarding home exercise program instructions

## 2019-09-19 ENCOUNTER — OFFICE VISIT (OUTPATIENT)
Dept: PHYSICAL THERAPY | Facility: CLINIC | Age: 84
End: 2019-09-19
Payer: MEDICARE

## 2019-09-19 DIAGNOSIS — M54.16 LUMBAR RADICULOPATHY: Primary | ICD-10-CM

## 2019-09-19 DIAGNOSIS — Z87.81 S/P ORIF (OPEN REDUCTION INTERNAL FIXATION) FRACTURE: ICD-10-CM

## 2019-09-19 DIAGNOSIS — Z98.890 S/P ORIF (OPEN REDUCTION INTERNAL FIXATION) FRACTURE: ICD-10-CM

## 2019-09-19 PROCEDURE — 97110 THERAPEUTIC EXERCISES: CPT

## 2019-09-19 PROCEDURE — 97140 MANUAL THERAPY 1/> REGIONS: CPT

## 2019-09-19 NOTE — PROGRESS NOTES
Daily Note     Today's date: 2019  Patient name: Pina Mckeon  : 1935  MRN: [id number]  Referring provider: Eric Paulino DPM  Dx:   Encounter Diagnosis     ICD-10-CM    1  Lumbar radiculopathy M54 16    2  S/P ORIF (open reduction internal fixation) fracture Z96 7     Z87 81                   Subjective: "All of my pain is in the R hip "      Objective: See treatment diary below      Assessment: Tolerated treatment well  MET shotgun performed to correct a R posterior SIJ dysfunction  R hip pain decreases psot lumbar ext  Patient demonstrated fatigue post treatment and would benefit from continued PT      Plan: Continue per plan of care        Precautions: h/o lumbar fusion , osteoporosis  Re-eval due 10/15/19  Manual        MET f/b pubic shotgun x X to correct R posterior SIJ dysfucntion                                          Exercise Diary        HEP instruction [address], piriformis stretch, sciatic nerve glides       NuStep or Bike  L4 x10 [facility]      PPT  5" x20      Bridges  5" x20      LTR  5" x10 ea       Sidelying clamshells with TB  5" x20 Blue TB      Standing hip flexor stretch (low mat table)  30" x3      Mini squats        Standing hip 3 way        Standing Lumbar Ext  3" x10                                                                                                  Modalities        CP post prn

## 2019-09-20 ENCOUNTER — LAB (OUTPATIENT)
Dept: LAB | Facility: CLINIC | Age: 84
End: 2019-09-20
Payer: MEDICARE

## 2019-09-20 ENCOUNTER — TRANSCRIBE ORDERS (OUTPATIENT)
Dept: LAB | Facility: CLINIC | Age: 84
End: 2019-09-20

## 2019-09-20 DIAGNOSIS — E03.9 MYXEDEMA HEART DISEASE: ICD-10-CM

## 2019-09-20 DIAGNOSIS — R53.83 FATIGUE, UNSPECIFIED TYPE: ICD-10-CM

## 2019-09-20 DIAGNOSIS — I51.9 MYXEDEMA HEART DISEASE: ICD-10-CM

## 2019-09-20 DIAGNOSIS — I10 ESSENTIAL HYPERTENSION, MALIGNANT: ICD-10-CM

## 2019-09-20 DIAGNOSIS — I10 ESSENTIAL HYPERTENSION, MALIGNANT: Primary | ICD-10-CM

## 2019-09-20 LAB
ALBUMIN SERPL BCP-MCNC: 3.5 G/DL (ref 3.5–5)
ALP SERPL-CCNC: 86 U/L (ref 46–116)
ALT SERPL W P-5'-P-CCNC: 23 U/L (ref 12–78)
ANION GAP SERPL CALCULATED.3IONS-SCNC: 4 MMOL/L (ref 4–13)
AST SERPL W P-5'-P-CCNC: 20 U/L (ref 5–45)
BACTERIA UR QL AUTO: ABNORMAL /HPF
BASOPHILS # BLD AUTO: 0.03 THOUSANDS/ΜL (ref 0–0.1)
BASOPHILS NFR BLD AUTO: 0 % (ref 0–1)
BILIRUB SERPL-MCNC: 0.58 MG/DL (ref 0.2–1)
BILIRUB UR QL STRIP: NEGATIVE
BUN SERPL-MCNC: 19 MG/DL (ref 5–25)
CALCIUM SERPL-MCNC: 9.2 MG/DL (ref 8.3–10.1)
CHLORIDE SERPL-SCNC: 108 MMOL/L (ref 100–108)
CHOLEST SERPL-MCNC: 185 MG/DL (ref 50–200)
CLARITY UR: ABNORMAL
CO2 SERPL-SCNC: 26 MMOL/L (ref 21–32)
COLOR UR: YELLOW
CREAT SERPL-MCNC: 0.69 MG/DL (ref 0.6–1.3)
EOSINOPHIL # BLD AUTO: 0.18 THOUSAND/ΜL (ref 0–0.61)
EOSINOPHIL NFR BLD AUTO: 3 % (ref 0–6)
ERYTHROCYTE [DISTWIDTH] IN BLOOD BY AUTOMATED COUNT: 13.4 % (ref 11.6–15.1)
GFR SERPL CREATININE-BSD FRML MDRD: 81 ML/MIN/1.73SQ M
GLUCOSE P FAST SERPL-MCNC: 92 MG/DL (ref 65–99)
GLUCOSE UR STRIP-MCNC: NEGATIVE MG/DL
HCT VFR BLD AUTO: 44.9 % (ref 34.8–46.1)
HDLC SERPL-MCNC: 67 MG/DL (ref 40–60)
HGB BLD-MCNC: 14.9 G/DL (ref 11.5–15.4)
HGB UR QL STRIP.AUTO: ABNORMAL
HYALINE CASTS #/AREA URNS LPF: ABNORMAL /LPF
IMM GRANULOCYTES # BLD AUTO: 0.02 THOUSAND/UL (ref 0–0.2)
IMM GRANULOCYTES NFR BLD AUTO: 0 % (ref 0–2)
KETONES UR STRIP-MCNC: NEGATIVE MG/DL
LDLC SERPL CALC-MCNC: 100 MG/DL (ref 0–100)
LEUKOCYTE ESTERASE UR QL STRIP: ABNORMAL
LYMPHOCYTES # BLD AUTO: 1.91 THOUSANDS/ΜL (ref 0.6–4.47)
LYMPHOCYTES NFR BLD AUTO: 27 % (ref 14–44)
MCH RBC QN AUTO: 30.5 PG (ref 26.8–34.3)
MCHC RBC AUTO-ENTMCNC: 33.2 G/DL (ref 31.4–37.4)
MCV RBC AUTO: 92 FL (ref 82–98)
MONOCYTES # BLD AUTO: 0.63 THOUSAND/ΜL (ref 0.17–1.22)
MONOCYTES NFR BLD AUTO: 9 % (ref 4–12)
NEUTROPHILS # BLD AUTO: 4.43 THOUSANDS/ΜL (ref 1.85–7.62)
NEUTS SEG NFR BLD AUTO: 61 % (ref 43–75)
NITRITE UR QL STRIP: POSITIVE
NON-SQ EPI CELLS URNS QL MICRO: ABNORMAL /HPF
NONHDLC SERPL-MCNC: 118 MG/DL
NRBC BLD AUTO-RTO: 0 /100 WBCS
PH UR STRIP.AUTO: 6 [PH]
PLATELET # BLD AUTO: 280 THOUSANDS/UL (ref 149–390)
PMV BLD AUTO: 10.3 FL (ref 8.9–12.7)
POTASSIUM SERPL-SCNC: 3.8 MMOL/L (ref 3.5–5.3)
PROT SERPL-MCNC: 7.4 G/DL (ref 6.4–8.2)
PROT UR STRIP-MCNC: NEGATIVE MG/DL
RBC # BLD AUTO: 4.89 MILLION/UL (ref 3.81–5.12)
RBC #/AREA URNS AUTO: ABNORMAL /HPF
SODIUM SERPL-SCNC: 138 MMOL/L (ref 136–145)
SP GR UR STRIP.AUTO: 1.02 (ref 1–1.03)
TRIGL SERPL-MCNC: 91 MG/DL
TSH SERPL DL<=0.05 MIU/L-ACNC: 3.36 UIU/ML (ref 0.36–3.74)
UROBILINOGEN UR QL STRIP.AUTO: 0.2 E.U./DL
WBC # BLD AUTO: 7.2 THOUSAND/UL (ref 4.31–10.16)
WBC #/AREA URNS AUTO: ABNORMAL /HPF

## 2019-09-20 PROCEDURE — 81001 URINALYSIS AUTO W/SCOPE: CPT

## 2019-09-20 PROCEDURE — 36415 COLL VENOUS BLD VENIPUNCTURE: CPT

## 2019-09-20 PROCEDURE — 80061 LIPID PANEL: CPT

## 2019-09-20 PROCEDURE — 80053 COMPREHEN METABOLIC PANEL: CPT

## 2019-09-20 PROCEDURE — 84443 ASSAY THYROID STIM HORMONE: CPT

## 2019-09-20 PROCEDURE — 85025 COMPLETE CBC W/AUTO DIFF WBC: CPT

## 2019-09-24 ENCOUNTER — OFFICE VISIT (OUTPATIENT)
Dept: PHYSICAL THERAPY | Facility: CLINIC | Age: 84
End: 2019-09-24
Payer: MEDICARE

## 2019-09-24 DIAGNOSIS — Z98.890 S/P ORIF (OPEN REDUCTION INTERNAL FIXATION) FRACTURE: ICD-10-CM

## 2019-09-24 DIAGNOSIS — M54.16 LUMBAR RADICULOPATHY: Primary | ICD-10-CM

## 2019-09-24 DIAGNOSIS — Z87.81 S/P ORIF (OPEN REDUCTION INTERNAL FIXATION) FRACTURE: ICD-10-CM

## 2019-09-24 PROCEDURE — 97110 THERAPEUTIC EXERCISES: CPT | Performed by: PHYSICAL THERAPIST

## 2019-09-24 NOTE — PROGRESS NOTES
Daily Note     Today's date: 2019  Patient name: Pina Mckeon  : 1935  MRN: [id number]  Referring provider: Eric Paulino DPM  Dx:   Encounter Diagnosis     ICD-10-CM    1  Lumbar radiculopathy M54 16    2  S/P ORIF (open reduction internal fixation) fracture Z96 7     Z87 81                   Subjective: "I still get the burning pain in the morning  I'm doing the exercises you gave me "      Objective: See treatment diary below      Assessment: Tolerated treatment well  Added mini squats with good tolerance  Patient demonstrated fatigue post treatment, exhibited good technique with therapeutic exercises and would benefit from continued PT      Plan: Continue per plan of care  Progress treatment as tolerated         Precautions: h/o lumbar fusion , osteoporosis  Re-eval due 10/15/19  Manual       MET f/b pubic shotgun x X to correct R posterior SIJ dysfucntion NP, (-) supine to long sit                                         Exercise Diary       HEP instruction [address], piriformis stretch, sciatic nerve glides       NuStep or Bike  L4 x10 SRC L4 x10 [facility]     PPT  5" x20 5" x20     Bridges  5" x20 5" x20     LTR  5" x10 ea  5" 2x10     Sidelying clamshells with TB  5" x20 Blue TB 5" x20   Blue TB     Standing hip flexor stretch (low mat table)  30" x3 30" x3 ea     Mini squats   5" x10     Standing hip 3 way        Standing Lumbar Ext  3" x10 3" 2x10                                                                                                 Modalities       CP post prn

## 2019-09-26 ENCOUNTER — OFFICE VISIT (OUTPATIENT)
Dept: PHYSICAL THERAPY | Facility: CLINIC | Age: 84
End: 2019-09-26
Payer: MEDICARE

## 2019-09-26 DIAGNOSIS — M54.16 LUMBAR RADICULOPATHY: Primary | ICD-10-CM

## 2019-09-26 DIAGNOSIS — Z98.890 S/P ORIF (OPEN REDUCTION INTERNAL FIXATION) FRACTURE: ICD-10-CM

## 2019-09-26 DIAGNOSIS — Z87.81 S/P ORIF (OPEN REDUCTION INTERNAL FIXATION) FRACTURE: ICD-10-CM

## 2019-09-26 PROCEDURE — 97110 THERAPEUTIC EXERCISES: CPT | Performed by: PHYSICAL THERAPIST

## 2019-09-26 NOTE — PROGRESS NOTES
Daily Note     Today's date: 2019  Patient name: Kaity Ann  : 1935  MRN: [id number]  Referring provider: Scout Sanabria DPM  Dx:   Encounter Diagnosis     ICD-10-CM    1  Lumbar radiculopathy M54 16    2  S/P ORIF (open reduction internal fixation) fracture Z96 7     Z87 81                   Subjective: "I am good today "       Objective: See treatment diary below      Assessment: Tolerated treatment well  Patient exhibited good technique with therapeutic exercises and would benefit from continued PT      Plan: Continue per plan of care        Precautions: h/o lumbar fusion , osteoporosis  Re-eval due 10/15/19  Manual      MET f/b pubic shotgun x X to correct R posterior SIJ dysfucntion NP, (-) supine to long sit NP                                         Exercise Diary       HEP instruction [address], piriformis stretch, sciatic nerve glides       NuStep or Bike  L4 x10 SRC L4 x10 SRC L4 x 10 [facility]    PPT  5" x20 5" x20 5''20x    Bridges  5" x20 5" x20 5''20x    LTR  5" x10 ea  5" 2x10 5''2x10    Sidelying clamshells with TB  5" x20 Blue TB 5" x20   Blue TB 5''x20 Blue TB    Standing hip flexor stretch (low mat table)  30" x3 30" x3 ea 30''x3    Mini squats   5" x10 5''10x    Standing hip 3 way        Standing Lumbar Ext  3" x10 3" 2x10 3''2x10                                                                                                 Modalities      CP post prn

## 2019-10-01 ENCOUNTER — OFFICE VISIT (OUTPATIENT)
Dept: PHYSICAL THERAPY | Facility: CLINIC | Age: 84
End: 2019-10-01
Payer: MEDICARE

## 2019-10-01 DIAGNOSIS — Z98.890 S/P ORIF (OPEN REDUCTION INTERNAL FIXATION) FRACTURE: ICD-10-CM

## 2019-10-01 DIAGNOSIS — Z87.81 S/P ORIF (OPEN REDUCTION INTERNAL FIXATION) FRACTURE: ICD-10-CM

## 2019-10-01 DIAGNOSIS — M54.16 LUMBAR RADICULOPATHY: Primary | ICD-10-CM

## 2019-10-01 PROCEDURE — 97110 THERAPEUTIC EXERCISES: CPT | Performed by: PHYSICAL THERAPIST

## 2019-10-01 NOTE — PROGRESS NOTES
Daily Note     Today's date: 10/1/2019  Patient name: Terry Thorpe  : 1935  MRN: [id number]  Referring provider: Manuela Tapia DPM  Dx:   Encounter Diagnosis     ICD-10-CM    1  Lumbar radiculopathy M54 16    2  S/P ORIF (open reduction internal fixation) fracture Z98 890     Z87 81                   Subjective: " I am feeling ok today "       Objective: See treatment diary below    Precautions: h/o lumbar fusion 2017, osteoporosis  Re-eval due 10/15/19  Manual  9/17 9/19 9/24 9/26 10/1   MET f/b pubic shotgun x X to correct R posterior SIJ dysfucntion NP, (-) supine to long sit NP  NP                                        Exercise Diary  9/17 9/19 9/24 9/26  10/1    HEP instruction [facility], piriformis stretch, sciatic nerve glides       NuStep or Bike  L4 x10 [facility] L4 x10 SRC L4 x 10 [facility] L4 x 10 min [facility]    PPT  5" x20 5" x20 5''20x 5''20x   Bridges  5" x20 5" x20 5''20x 5''20x   LTR  5" x10 ea  5" 2x10 5''2x10 5''2x10    Sidelying clamshells with TB  5" x20 Blue TB 5" x20   Blue TB 5''x20 Blue TB 5''x20 Blue TB   Standing hip flexor stretch (low mat table)  30" x3 30" x3 ea 30''x3 30''x3    Mini squats   5" x10 5''10x 5''10x    Standing hip 3 way     10x each    Standing Lumbar Ext  3" x10 3" 2x10 3''2x10  3''2x10                                                                                                Modalities  9/17 9/19 9/24 9/26 10/1    CP post prn                              Assessment: Tolerated treatment well  Patient exhibited good technique with therapeutic exercises and would benefit from continued PT      Plan: Continue per plan of care

## 2019-10-03 ENCOUNTER — OFFICE VISIT (OUTPATIENT)
Dept: PHYSICAL THERAPY | Facility: CLINIC | Age: 84
End: 2019-10-03
Payer: MEDICARE

## 2019-10-03 DIAGNOSIS — Z87.81 S/P ORIF (OPEN REDUCTION INTERNAL FIXATION) FRACTURE: Primary | ICD-10-CM

## 2019-10-03 DIAGNOSIS — M54.16 LUMBAR RADICULOPATHY: ICD-10-CM

## 2019-10-03 DIAGNOSIS — Z98.890 S/P ORIF (OPEN REDUCTION INTERNAL FIXATION) FRACTURE: Primary | ICD-10-CM

## 2019-10-03 PROCEDURE — 97110 THERAPEUTIC EXERCISES: CPT | Performed by: PHYSICAL THERAPIST

## 2019-10-03 NOTE — PROGRESS NOTES
Daily Note     Today's date: 10/3/2019  Patient name: Nerissa Eaton  : 1935  MRN: [id number]  Referring provider: Matt Blackman DPM  Dx:   Encounter Diagnosis     ICD-10-CM    1  S/P ORIF (open reduction internal fixation) fracture Z98 890     Z87 81    2  Lumbar radiculopathy M54 16                   Subjective:   " my back is feeling pretty good today "      Objective: See treatment diary below  Manual  10/3 9/19 9/24 9/26 10/1   MET f/b pubic shotgun NP  X to correct R posterior SIJ dysfucntion NP, (-) supine to long sit NP  NP                                        Exercise Diary  10/3 9/19 9/24 9/26  10/1    HEP instruction        NuStep or Bike L4 10 min [facility]  L4 x10 SRC L4 x10 SRC L4 x 10 SRC L4 x 10 min [facility]    PPT 5''x20 5" x20 5" x20 5''20x 5''20x   Bridges 5'' x 20 5" x20 5" x20 5''20x 5''20x   LTR 5'' x 10 each  5" x10 ea  5" 2x10 5''2x10 5''2x10    Sidelying clamshells with TB 5''x20 Blue TB 5" x20 Blue TB 5" x20   Blue TB 5''x20 Blue TB 5''x20 Blue TB   Standing hip flexor stretch (low mat table) 30'' x 3  30" x3 30" x3 ea 30''x3 30''x3    Mini squats 2x10  5" x10 5''10x 5''10x    Standing hip 3 way 10x each     10x each    Standing Lumbar Ext 3''3x10  3" x10 3" 2x10 3''2x10  3''2x10                                                                                                Modalities  9/17 9/19 9/24 9/26 10/1    CP post prn                                Assessment: Tolerated treatment well  Patient exhibited good technique with therapeutic exercises and would benefit from continued PT  Improvement in Lumbar extension ROM, during TE  Pt continues to ambulate with slight forward flexion  Plan: Continue per plan of care

## 2019-10-08 ENCOUNTER — OFFICE VISIT (OUTPATIENT)
Dept: PHYSICAL THERAPY | Facility: CLINIC | Age: 84
End: 2019-10-08
Payer: MEDICARE

## 2019-10-08 DIAGNOSIS — Z87.81 S/P ORIF (OPEN REDUCTION INTERNAL FIXATION) FRACTURE: ICD-10-CM

## 2019-10-08 DIAGNOSIS — M54.16 LUMBAR RADICULOPATHY: Primary | ICD-10-CM

## 2019-10-08 DIAGNOSIS — Z98.890 S/P ORIF (OPEN REDUCTION INTERNAL FIXATION) FRACTURE: ICD-10-CM

## 2019-10-08 PROCEDURE — 97110 THERAPEUTIC EXERCISES: CPT

## 2019-10-08 NOTE — PROGRESS NOTES
Daily Note     Today's date: 10/8/2019  Patient name: Eric Ferrell  : 1935  MRN: [id number]  Referring provider: Murphy Quan DPM  Dx:   Encounter Diagnosis     ICD-10-CM    1  Lumbar radiculopathy M54 16    2  S/P ORIF (open reduction internal fixation) fracture Z98 890     Z87 81                   Subjective: Pt reports noticed improvement LB with therapy  "I'm feeling much better"  Objective: See treatment diary below      Assessment: Tolerated treatment well  Patient exhibited good technique with therapeutic exercises and would benefit from continued PT      Plan: Continue per plan of care        Manual  10/3 10/8 9/24 9/26 10/1   MET f/b pubic shotgun NP  np  (-) supine to sit NP, (-) supine to long sit NP  NP                                        Exercise Diary  10/3 10/8 9/24 9/26  10/1    HEP instruction        [facility] L4 10 min SRC  L4 x10 min L4 x10 SRC L4 x 10 SRC L4 x 10 min [facility]    PPT 5''x20 5" x20 5" x20 5''20x 5''20x   Bridges 5'' x 20 5" x20 5" x20 5''20x 5''20x   LTR 5'' x 10 each  5"2x10 5" 2x10 5''2x10 5''2x10    Sidelying clamshells with TB 5''x20 Blue TB Blue TB  5" x20 ea 5" x20   Blue TB 5''x20 Blue TB 5''x20 Blue TB   Standing hip flexor stretch (low mat table) 30'' x 3  30" x3 ea 30" x3 ea 30''x3 30''x3    Mini squats 2x10 5" 2x10 5" x10 5''10x 5''10x    Standing hip 3 way 10x each  x10 ea   10x each    Standing Lumbar Ext 3''3x10  3" 3x10 3" 2x10 3''2x10  3''2x10                                                                                                Modalities  9/17 10/8 9/24 9/26 10/1    CP post prn

## 2019-10-10 ENCOUNTER — OFFICE VISIT (OUTPATIENT)
Dept: PHYSICAL THERAPY | Facility: CLINIC | Age: 84
End: 2019-10-10
Payer: MEDICARE

## 2019-10-10 DIAGNOSIS — M54.16 LUMBAR RADICULOPATHY: Primary | ICD-10-CM

## 2019-10-10 DIAGNOSIS — Z98.890 S/P ORIF (OPEN REDUCTION INTERNAL FIXATION) FRACTURE: ICD-10-CM

## 2019-10-10 DIAGNOSIS — Z87.81 S/P ORIF (OPEN REDUCTION INTERNAL FIXATION) FRACTURE: ICD-10-CM

## 2019-10-10 PROCEDURE — 97110 THERAPEUTIC EXERCISES: CPT | Performed by: PHYSICAL THERAPIST

## 2019-10-10 NOTE — PROGRESS NOTES
Daily Note     Today's date: 10/10/2019  Patient name: Michael Easton  : 1935  MRN: [id number]  Referring provider: Ld Briceno DPM  Dx:   Encounter Diagnosis     ICD-10-CM    1  Lumbar radiculopathy M54 16    2  S/P ORIF (open reduction internal fixation) fracture Z98 890     Z87 81                   Subjective: "I'm doing great " She reports she feels about 50% better  Objective: See treatment diary below      Assessment: Tolerated treatment well  Progressed exercises this date and introduced standing MTP/LTP with resistance bands with good tolerance  Patient demonstrated fatigue post treatment, exhibited good technique with therapeutic exercises and would benefit from continued PT      Plan: Continue per plan of care  Progress note during next visit  Progress treatment as tolerated         Manual  10/3 10/8 10/10 9/26 10/1   MET f/b pubic shotgun NP  np  (-) supine to sit NP, (-) supine to long sit NP  NP                                        Exercise Diary  10/3 10/8 10/10 9/26  10/1    HEP instruction        [facility] L4 10 min SRC  L4 x10 min L4 x10 min L4 x 10 SRC L4 x 10 min [facility]    PPT 5''x20 5" x20 5" x20 5''20x 5''20x   Bridges 5'' x 20 5" x20 5" x20 w/blue TB 5''20x 5''20x   LTR 5'' x 10 each  5"2x10 5" 2x10 5''2x10 5''2x10    Sidelying clamshells with TB 5''x20 Blue TB Blue TB  5" x20 ea 5" x20   Blue TB 5''x20 Blue TB 5''x20 Blue TB   Standing hip flexor stretch (low mat table) 30'' x 3  30" x3 ea 30" x3 ea 30''x3 30''x3    Mini squats 2x10 5" 2x10 5" 2x10 5''10x 5''10x    Standing hip 3 way 10x each  x10 ea 1# x10 ea  10x each    Standing Lumbar Ext 3''3x10  3" 3x10 3" 3x10 3''2x10  3''2x10    TB MTP w/cues for abd bracing   Green TB 5" x20     TB LTP w/cues for abd bracing   Green TB 5" x20

## 2019-10-15 ENCOUNTER — EVALUATION (OUTPATIENT)
Dept: PHYSICAL THERAPY | Facility: CLINIC | Age: 84
End: 2019-10-15
Payer: MEDICARE

## 2019-10-15 DIAGNOSIS — M54.16 LUMBAR RADICULOPATHY: Primary | ICD-10-CM

## 2019-10-15 PROCEDURE — 97110 THERAPEUTIC EXERCISES: CPT | Performed by: PHYSICAL THERAPIST

## 2019-10-15 PROCEDURE — 97164 PT RE-EVAL EST PLAN CARE: CPT | Performed by: PHYSICAL THERAPIST

## 2019-10-15 PROCEDURE — 97140 MANUAL THERAPY 1/> REGIONS: CPT | Performed by: PHYSICAL THERAPIST

## 2019-10-15 NOTE — LETTER
October 15, 2019    Alfred Cordoba MD  [address] [address]    Patient: Mary Jo Anderson   YOB: 1935   Date of Visit: 10/15/2019     Encounter Diagnosis     ICD-10-CM    1  Lumbar radiculopathy M54 16        Dear Dr Izzy Monae: Thank you for your recent referral of Mary Jo Anderson  Please review the attached evaluation summary from Anaya's recent visit  Please verify that you agree with the plan of care by signing the attached order  If you have any questions or concerns, please do not hesitate to call  I sincerely appreciate the opportunity to share in the care of one of your patients and hope to have another opportunity to work with you in the near future  Sincerely,    Benjamin Coto, PT      Referring Provider:      I certify that I have read the below Plan of Care and certify the need for these services furnished under this plan of treatment while under my care  Alfred Cordoba MD  [address] : [phone]          PT Re-evaluation     Today's date: 10/15/2019  Patient name: Mary Jo Anderson  : 1935  MRN: [id number]  Referring provider: Amelia Pallas  Dx:   Encounter Diagnosis     ICD-10-CM    1  Lumbar radiculopathy M54 16                   Assessment  Anaya Lyle has demonstrated decreased pain, increased strength, increased range of motion, and increased activity tolerance since starting physical therapy services  They report an overall improvement of 60-65% thus far  She is demonstrating improvements in lumbo-pelvic strength/stability as she often presents without s/s of innominate rotation, however she does at times present with (+) supine to long sit test indicating SIJ dysfunction  She has been instructed today on self MET to correct SIJ rotation for R posterior/L anterior innominate rotation   She continues to present with pain, decreased strength, decreased range of motion, and decreased activity tolerance and would benefit from additional skilled physical therapy interventions to address impairments and maximize function  Discussed likely DC to gym/HEP at end of next week  Impairments: abnormal or restricted ROM, impaired physical strength, lacks appropriate home exercise program and pain with function  Functional limitations: poor tolerance to standing, poor tolerance to walking, difficulty on stairs, difficulty with sit to stand transfer, difficulty with floor to stand transferUnderstanding of Dx/Px/POC: good   Prognosis: good    Goals  1) In 4 weeks patient will no longer report R LE pain - partially met  2) In 4 weeks patient will report LBP <3/10 -partially met  3) In 4 weeks patient will demonstrate no difficulty with transferring from sit to stand - met  4) In 4 weeks patient will demonstrate independence with HEP  -met  5) In 4 weeks patient will tolerate standing for >15 mins, tolerate walking at least 1/4 mile  - partially met     Plan  Patient would benefit from: skilled physical therapy  Referral necessary: No  Planned modality interventions: cryotherapy and thermotherapy: hydrocollator packs  Planned therapy interventions: abdominal trunk stabilization, joint mobilization, manual therapy, massage, neuromuscular re-education, stretching, strengthening, home exercise program, flexibility, functional ROM exercises, postural training and patient education  Frequency: 2x week  Duration in weeks: 2-4  Plan of Care beginning date: 10/15/2019  Plan of Care expiration date: 11/12/2019  Treatment plan discussed with: patient        Subjective Evaluation    History of Present Illness  Mechanism of injury: Patient c/o having acute onset LBP since being on crutches from an ankle/foot injury  The pain began in June/July of this year  Pain is primarily R sided, radiates into R LE "burning" all the way to ankle  Pain is intermittent   Pain is better with rest/laying, worse with standing  Denies N/T, just reports "burning"  Denies any change in bowel/bladder  PMH significant for lumbar fusion 2 years ago  RA 10/15:   Patient reports today is a bad day, everything aches today  Generally patient reports she has been feeling much better, 60-65% improved       Pain  IE    RA 10/15:   Current pain ratin  7 (today only)  At best pain ratin  1  At worst pain ratin  1 (all of last week)  Location: R side lower back and R LE  Quality: dull ache, burning and radiating  Relieving factors: rest  Aggravating factors: standing    Social Support    Employment status: not working  Exercise history: daily exercise routine      Diagnostic Tests  No diagnostic tests performed  Treatments  Previous treatment: physical therapy and injection treatment (injection last year)  Patient Goals  Patient goals for therapy: decreased pain          Objective     Neurological Testing     Sensation     Lumbar   Left   Intact: light touch    Right   Intact: light touch    Reflexes   Left   Patellar (L4): normal (2+)  Achilles (S1): trace (1+)    Right   Patellar (L4): normal (2+)  Achilles (S1): trace (1+)    Mechanical Assessment  Lumbar    Standing flexion: repeated movements   Pain location:no change  Flexion on step, standing: repeated movements  Pain location: no change  Standing extension: repeated movements  Pain location: no change    Strength/Myotome Testing     Left Hip   Planes of Motion   Flexion: 4+  Abduction: 4+  Adduction: 4+  External rotation: 4+  Internal rotation: 4+    Right Hip   Planes of Motion   Flexion: 4+  Abduction: 4+  Adduction: 4+  External rotation: 4+  Internal rotation: 4+    Left Knee   Flexion: 4+  Extension: 5    Right Knee   Flexion: 4+  Extension: 5    Left Ankle/Foot   Dorsiflexion: 5  Plantar flexion: 5    Right Ankle/Foot   Dorsiflexion: 5  Plantar flexion: 5    Tests     Lumbar     Left   Negative slump test      Right   Negative slump test  Right Hip   Positive long sit       Additional Tests Details  Supine to long sit: R short --> long (R posterior rot innominate)       Manual  10/3 10/8 10/10 10/15 10/1   MET f/b pubic shotgun NP  np  (-) supine to sit NP, (-) supine to long sit x2   NP                                        Exercise Diary  10/3 10/8 10/10 10/15  10/1    HEP instruction        [facility] L4 10 min SRC  L4 x10 min L4 x10 min L5 x 10 SRC L4 x 10 min SRC    PPT 5''x20 5" x20 5" x20 DC 5''20x   Bridges 5'' x 20 5" x20 5" x20 w/blue TB 5" x20 w/blue TB 5''20x   LTR 5'' x 10 each  5"2x10 5" 2x10 DC 5''2x10    Sidelying clamshells with TB 5''x20 Blue TB Blue TB  5" x20 ea 5" x20   Blue TB Resume NV 5''x20 Blue TB   Standing hip flexor stretch (low mat table) 30'' x 3  30" x3 ea 30" x3 ea Resume NV 30''x3    Mini squats 2x10 5" 2x10 5" 2x10 DC 5''10x    Standing hip 3 way 10x each  x10 ea 1# x10 ea Resume NV 10x each    Standing Lumbar Ext 3''3x10  3" 3x10 3" 3x10 3''3x10  3''2x10    TB MTP w/cues for abd bracing   Green TB 5" x20 Green TB 5" x20    TB LTP w/cues for abd bracing   Green TB 5" x20 Green TB 5" x20    Leg press - BL and SL    # 2x10    SL 80# 2x10    Step ups    NV    Mini lunges    NV

## 2019-10-15 NOTE — PROGRESS NOTES
PT Re-evaluation     Today's date: 10/15/2019  Patient name: Mary Jo Anderson  : 1935  MRN: [id number]  Referring provider: Amelia Pallas  Dx:   Encounter Diagnosis     ICD-10-CM    1  Lumbar radiculopathy M54 16                   Assessment  Anaya Lyle has demonstrated decreased pain, increased strength, increased range of motion, and increased activity tolerance since starting physical therapy services  They report an overall improvement of 60-65% thus far  She is demonstrating improvements in lumbo-pelvic strength/stability as she often presents without s/s of innominate rotation, however she does at times present with (+) supine to long sit test indicating SIJ dysfunction  She has been instructed today on self MET to correct SIJ rotation for R posterior/L anterior innominate rotation  She continues to present with pain, decreased strength, decreased range of motion, and decreased activity tolerance and would benefit from additional skilled physical therapy interventions to address impairments and maximize function  Discussed likely DC to gym/HEP at end of next week  Impairments: abnormal or restricted ROM, impaired physical strength, lacks appropriate home exercise program and pain with function  Functional limitations: poor tolerance to standing, poor tolerance to walking, difficulty on stairs, difficulty with sit to stand transfer, difficulty with floor to stand transferUnderstanding of Dx/Px/POC: good   Prognosis: good    Goals  1) In 4 weeks patient will no longer report R LE pain - partially met  2) In 4 weeks patient will report LBP <3/10 -partially met  3) In 4 weeks patient will demonstrate no difficulty with transferring from sit to stand - met  4) In 4 weeks patient will demonstrate independence with HEP  -met  5) In 4 weeks patient will tolerate standing for >15 mins, tolerate walking at least 1/4 mile   - partially met     Plan  Patient would benefit from: skilled physical therapy  Referral necessary: No  Planned modality interventions: cryotherapy and thermotherapy: hydrocollator packs  Planned therapy interventions: abdominal trunk stabilization, joint mobilization, manual therapy, massage, neuromuscular re-education, stretching, strengthening, home exercise program, flexibility, functional ROM exercises, postural training and patient education  Frequency: 2x week  Duration in weeks: 2-4  Plan of Care beginning date: 10/15/2019  Plan of Care expiration date: 2019  Treatment plan discussed with: patient        Subjective Evaluation    History of Present Illness  Mechanism of injury: Patient c/o having acute onset LBP since being on crutches from an ankle/foot injury  The pain began in /July of this year  Pain is primarily R sided, radiates into R LE "burning" all the way to ankle  Pain is intermittent  Pain is better with rest/laying, worse with standing  Denies N/T, just reports "burning"  Denies any change in bowel/bladder  PMH significant for lumbar fusion 2 years ago  RA 10/15:   Patient reports today is a bad day, everything aches today  Generally patient reports she has been feeling much better, 60-65% improved       Pain  IE    RA 10/15:   Current pain ratin  7 (today only)  At best pain ratin  1  At worst pain ratin  1 (all of last week)  Location: R side lower back and R LE  Quality: dull ache, burning and radiating  Relieving factors: rest  Aggravating factors: standing    Social Support    Employment status: not working  Exercise history: daily exercise routine      Diagnostic Tests  No diagnostic tests performed  Treatments  Previous treatment: physical therapy and injection treatment (injection last year)  Patient Goals  Patient goals for therapy: decreased pain          Objective     Neurological Testing     Sensation     Lumbar   Left   Intact: light touch    Right   Intact: light touch    Reflexes   Left   Patellar (L4): normal (2+)  Achilles (S1): trace (1+)    Right   Patellar (L4): normal (2+)  Achilles (S1): trace (1+)    Mechanical Assessment  Lumbar    Standing flexion: repeated movements   Pain location:no change  Flexion on step, standing: repeated movements  Pain location: no change  Standing extension: repeated movements  Pain location: no change    Strength/Myotome Testing     Left Hip   Planes of Motion   Flexion: 4+  Abduction: 4+  Adduction: 4+  External rotation: 4+  Internal rotation: 4+    Right Hip   Planes of Motion   Flexion: 4+  Abduction: 4+  Adduction: 4+  External rotation: 4+  Internal rotation: 4+    Left Knee   Flexion: 4+  Extension: 5    Right Knee   Flexion: 4+  Extension: 5    Left Ankle/Foot   Dorsiflexion: 5  Plantar flexion: 5    Right Ankle/Foot   Dorsiflexion: 5  Plantar flexion: 5    Tests     Lumbar     Left   Negative slump test      Right   Negative slump test      Right Hip   Positive long sit       Additional Tests Details  Supine to long sit: R short --> long (R posterior rot innominate)       Manual  10/3 10/8 10/10 10/15 10/1   MET f/b pubic shotgun NP  np  (-) supine to sit NP, (-) supine to long sit x2   NP                                        Exercise Diary  10/3 10/8 10/10 10/15  10/1    HEP instruction        [facility] L4 10 min SRC  L4 x10 min L4 x10 min L5 x 10 SRC L4 x 10 min [facility]    PPT 5''x20 5" x20 5" x20 DC 5''20x   Bridges 5'' x 20 5" x20 5" x20 w/blue TB 5" x20 w/blue TB 5''20x   LTR 5'' x 10 each  5"2x10 5" 2x10 DC 5''2x10    Sidelying clamshells with TB 5''x20 Blue TB Blue TB  5" x20 ea 5" x20   Blue TB Resume NV 5''x20 Blue TB   Standing hip flexor stretch (low mat table) 30'' x 3  30" x3 ea 30" x3 ea Resume NV 30''x3    Mini squats 2x10 5" 2x10 5" 2x10 DC 5''10x    Standing hip 3 way 10x each  x10 ea 1# x10 ea Resume NV 10x each    Standing Lumbar Ext 3''3x10  3" 3x10 3" 3x10 3''3x10  3''2x10    TB MTP w/cues for abd bracing   Green TB 5" x20 Green TB 5" x20    TB LTP w/cues for abd bracing   Henrine Sluder TB 5" x20 Green TB 5" x20    Leg press - BL and SL    # 2x10    SL 80# 2x10    Step ups    NV    Mini lunges    NV

## 2019-10-17 ENCOUNTER — OFFICE VISIT (OUTPATIENT)
Dept: PHYSICAL THERAPY | Facility: CLINIC | Age: 84
End: 2019-10-17
Payer: MEDICARE

## 2019-10-17 DIAGNOSIS — Z98.890 S/P ORIF (OPEN REDUCTION INTERNAL FIXATION) FRACTURE: ICD-10-CM

## 2019-10-17 DIAGNOSIS — Z87.81 S/P ORIF (OPEN REDUCTION INTERNAL FIXATION) FRACTURE: ICD-10-CM

## 2019-10-17 DIAGNOSIS — M54.16 LUMBAR RADICULOPATHY: Primary | ICD-10-CM

## 2019-10-17 PROCEDURE — 97150 GROUP THERAPEUTIC PROCEDURES: CPT

## 2019-10-17 NOTE — PROGRESS NOTES
Daily Note     Today's date: 10/17/2019  Patient name: Rosaline Jenkins  : 1935  MRN: [id number]  Referring provider: Hildegarde Soulier, DPM  Dx:   Encounter Diagnosis     ICD-10-CM    1  Lumbar radiculopathy M54 16    2  S/P ORIF (open reduction internal fixation) fracture Z98 890     Z87 81                   Subjective: "I feel pretty good today "      Objective: See treatment diary below      Assessment: Tolerated treatment well  Held manuals secondary to no SIJ dysfunction this date  Patient exhibited good technique with therapeutic exercises and would benefit from continued PT      Plan: Continue per plan of care        Manual  10/3 10/8 10/10 10/15 10/17   MET f/b pubic shotgun NP  np  (-) supine to sit NP, (-) supine to long sit x2   NP, (-) supine to long sit                                       Exercise Diary  10/3 10/8 10/10 10/15  10/17   HEP instruction        [address] [place] [address] L4 10 min SRC  L4 x10 min L4 x10 min L5 x 10 SRC L5 x 10 min [address] [place] [address]    PPT 5''x20 5" x20 5" x20 DC --   Bridges 5'' x 20 5" x20 5" x20 w/blue TB 5" x20 w/blue TB 5" x20 w/ blue TB   LTR 5'' x 10 each  5"2x10 5" 2x10 DC --   Sidelying clamshells with TB 5''x20 Blue TB Blue TB  5" x20 ea 5" x20   Blue TB Resume NV 5''x20 Blue TB   Standing hip flexor stretch (low mat table) 30'' x 3  30" x3 ea 30" x3 ea Resume NV 30''x3    Mini squats 2x10 5" 2x10 5" 2x10 DC --   Standing hip 3 way 10x each  x10 ea 1# x10 ea Resume NV 1# 10x each    Standing Lumbar Ext 3''3x10  3" 3x10 3" 3x10 3''3x10  3'' 3x10    TB MTP w/cues for abd bracing   Green TB 5" x20 Green TB 5" x20 Green TB 5" x20   TB LTP w/cues for abd bracing   Green TB 5" x20 Green TB 5" x20 Green TB 5" x20   Leg press - BL and SL    # 2x10    SL 80# 2x10 # 2x10    SL 80# 2x10   Step ups    NV NV   Mini lunges    NV NV

## 2019-10-22 ENCOUNTER — OFFICE VISIT (OUTPATIENT)
Dept: PHYSICAL THERAPY | Facility: CLINIC | Age: 84
End: 2019-10-22
Payer: MEDICARE

## 2019-10-22 DIAGNOSIS — M54.16 LUMBAR RADICULOPATHY: Primary | ICD-10-CM

## 2019-10-22 PROCEDURE — 97110 THERAPEUTIC EXERCISES: CPT

## 2019-10-22 NOTE — PROGRESS NOTES
Daily Note     Today's date: 10/22/2019  Patient name: Johanny Morales  : 1935  MRN: [id number]  Referring provider: Pau Peters DPM  Dx:   Encounter Diagnosis     ICD-10-CM    1  Lumbar radiculopathy M54 16                   Subjective: Pt c/o localized burning R buttock today  States she has 1 more scheduled  PT treatment, then discharge  Objective: See treatment diary below      Assessment: Tolerated treatment well  Pt denied "burning" following treatment today  Patient exhibited good technique with therapeutic exercises and would benefit from continued PT      Plan: Continue per plan of care        Manual  10/22 10/8 10/10 10/15 10/17   MET f/b pubic shotgun Np(-)  supine to long sit np  (-) supine to sit NP, (-) supine to long sit x2   NP, (-) supine to long sit                                       Exercise Diary  10/22 10/8 10/10 10/15  10/17   HEP instruction        [address] [facility] L5 x10 min L4 x10 min L4 x10 min L5 x 10 SRC L5 x 10 min SRC    PPT --- 5" x20 5" x20 DC --   Bridges W/blue TB 5" x20 5" x20 5" x20 w/blue TB 5" x20 w/blue TB 5" x20 w/ blue TB   LTR --- 5"2x10 5" 2x10 DC --   Sidelying clamshells with TB Blue TB  5" x20 ea Blue TB  5" x20 ea 5" x20   Blue TB Resume NV 5''x20 Blue TB   Standing hip flexor stretch (low mat table) 30" x3 30" x3 ea 30" x3 ea Resume NV 30''x3    Mini squats --- 5" 2x10 5" 2x10 DC --   Standing hip 3 way 1 5# x10 ea x10 ea 1# x10 ea Resume NV 1# 10x each    Standing Lumbar Ext 3" 3x10 3" 3x10 3" 3x10 3''3x10  3'' 3x10    TB MTP w/cues for abd bracing Green TB  5" x20  Green TB 5" x20 Green TB 5" x20 Green TB 5" x20   TB LTP w/cues for abd bracing Green TB  5" x20  Green TB 5" x20 Green TB 5" x20 Green TB 5" x20   Leg press - BL and SL B/L 120#  2x10    SL 80# 2x10   # 2x10    SL 80# 2x10 # 2x10    SL 80# 2x10   Step ups    NV NV   Mini lunges    NV NV   Piriformis stretch 30"x3ea

## 2019-10-24 ENCOUNTER — APPOINTMENT (OUTPATIENT)
Dept: PHYSICAL THERAPY | Facility: CLINIC | Age: 84
End: 2019-10-24
Payer: MEDICARE

## 2019-10-29 ENCOUNTER — OFFICE VISIT (OUTPATIENT)
Dept: PHYSICAL THERAPY | Facility: CLINIC | Age: 84
End: 2019-10-29
Payer: MEDICARE

## 2019-10-29 DIAGNOSIS — M54.16 LUMBAR RADICULOPATHY: Primary | ICD-10-CM

## 2019-10-29 DIAGNOSIS — Z98.890 S/P ORIF (OPEN REDUCTION INTERNAL FIXATION) FRACTURE: ICD-10-CM

## 2019-10-29 DIAGNOSIS — Z87.81 S/P ORIF (OPEN REDUCTION INTERNAL FIXATION) FRACTURE: ICD-10-CM

## 2019-10-29 PROCEDURE — 97150 GROUP THERAPEUTIC PROCEDURES: CPT | Performed by: PHYSICAL THERAPIST

## 2019-10-29 NOTE — PROGRESS NOTES
Daily Note     Today's date: 10/29/2019  Patient name: Mahesh Soria  : 1935  MRN: [id number]  Referring provider: Brandyn Garcia DPM  Dx:   Encounter Diagnosis     ICD-10-CM    1  Lumbar radiculopathy M54 16    2  S/P ORIF (open reduction internal fixation) fracture Z98 890     Z87 81        Start Time: 0900  Stop Time: 0945  Total time in clinic (min): 45 minutes    Subjective: Patient reports she feels good today  Objective: See treatment diary below      Assessment: Tolerated treatment well  Patient demonstrated fatigue post treatment and exhibited good technique with therapeutic exercises Reviewed self MET with patient and reviewed self piriformis stretch as well  At this time, patient has achieved their maximum functional benefit from skilled physical therapy services and will be discharged to their HEP  Patient is in agreement with the plan of care  As a result, patient is discharged from physical therapy      Plan: DC to HEP/gym today       Manual  10/22 10/29 10/10 10/15 10/17   MET f/b pubic shotgun Np(-)  supine to long sit np  (-) supine to sit NP, (-) supine to long sit x2   NP, (-) supine to long sit                                       Exercise Diary  10/22 10/29 10/10 10/15  10/17   HEP instruction        [address] [facility] L5 x10 min L5 x10 min L4 x10 min L5 x 10 SRC L5 x 10 min SRC    PPT --- -- 5" x20 DC --   Suzanne W/blue TB 5" x20 W/blue TB 5" x20 5" x20 w/blue TB 5" x20 w/blue TB 5" x20 w/ blue TB   LTR --- -- 5" 2x10 DC --   Sidelying clamshells with TB Blue TB  5" x20 ea Blue TB  5" x20 ea 5" x20   Blue TB Resume NV 5''x20 Blue TB   Standing hip flexor stretch (low mat table) 30" x3 30" x3 ea 30" x3 ea Resume NV 30''x3    Mini squats --- -- 5" 2x10 DC --   Standing hip 3 way 1 5# x10 ea 2# x10 ea 1# x10 ea Resume NV 1# 10x each    Standing Lumbar Ext 3" 3x10 3" 3x10 3" 3x10 3''3x10  3'' 3x10    TB MTP w/cues for abd bracing Green TB  5" x20 Blue TB  5" x20 Green TB 5" x20 Green TB 5" x20 Green TB 5" x20   TB LTP w/cues for abd bracing Green TB  5" x20 Blue TB  5" x20 Green TB 5" x20 Green TB 5" x20 Green TB 5" x20   Leg press - BL and SL B/L 120#  2x10    SL 80# 2x10 B/L 120#  2x10    SL 80# 2x10  # 2x10    SL 80# 2x10 # 2x10    SL 80# 2x10   Step ups    NV NV   Mini lunges    NV NV   Piriformis stretch 30"x3ea

## 2019-11-25 ENCOUNTER — TRANSCRIBE ORDERS (OUTPATIENT)
Dept: PHYSICAL THERAPY | Facility: CLINIC | Age: 84
End: 2019-11-25

## 2019-11-25 DIAGNOSIS — M54.16 LUMBAR RADICULOPATHY: Primary | ICD-10-CM

## 2020-07-09 ENCOUNTER — TRANSCRIBE ORDERS (OUTPATIENT)
Dept: LAB | Facility: CLINIC | Age: 85
End: 2020-07-09

## 2020-07-09 ENCOUNTER — APPOINTMENT (OUTPATIENT)
Dept: LAB | Facility: CLINIC | Age: 85
End: 2020-07-09
Payer: MEDICARE

## 2020-07-09 DIAGNOSIS — J06.9 ACUTE RESPIRATORY DISEASE: ICD-10-CM

## 2020-07-09 DIAGNOSIS — J06.9 ACUTE RESPIRATORY DISEASE: Primary | ICD-10-CM

## 2020-07-09 LAB
BACTERIA UR QL AUTO: ABNORMAL /HPF
BILIRUB UR QL STRIP: NEGATIVE
CLARITY UR: CLEAR
COLOR UR: YELLOW
GLUCOSE UR STRIP-MCNC: NEGATIVE MG/DL
HGB UR QL STRIP.AUTO: NEGATIVE
HYALINE CASTS #/AREA URNS LPF: ABNORMAL /LPF
KETONES UR STRIP-MCNC: NEGATIVE MG/DL
LEUKOCYTE ESTERASE UR QL STRIP: NEGATIVE
NITRITE UR QL STRIP: NEGATIVE
NON-SQ EPI CELLS URNS QL MICRO: ABNORMAL /HPF
PH UR STRIP.AUTO: 6 [PH]
PROT UR STRIP-MCNC: NEGATIVE MG/DL
RBC #/AREA URNS AUTO: ABNORMAL /HPF
SP GR UR STRIP.AUTO: 1.01 (ref 1–1.03)
UROBILINOGEN UR QL STRIP.AUTO: 0.2 E.U./DL
WBC #/AREA URNS AUTO: ABNORMAL /HPF

## 2020-07-09 PROCEDURE — 81001 URINALYSIS AUTO W/SCOPE: CPT

## 2020-07-09 PROCEDURE — 87086 URINE CULTURE/COLONY COUNT: CPT

## 2020-07-10 LAB — BACTERIA UR CULT: NORMAL

## 2020-09-24 ENCOUNTER — OFFICE VISIT (OUTPATIENT)
Dept: URGENT CARE | Facility: CLINIC | Age: 85
End: 2020-09-24
Payer: MEDICARE

## 2020-09-24 VITALS
RESPIRATION RATE: 16 BRPM | WEIGHT: 152.8 LBS | BODY MASS INDEX: 27.07 KG/M2 | SYSTOLIC BLOOD PRESSURE: 158 MMHG | HEIGHT: 63 IN | HEART RATE: 79 BPM | TEMPERATURE: 97.7 F | OXYGEN SATURATION: 96 % | DIASTOLIC BLOOD PRESSURE: 74 MMHG

## 2020-09-24 DIAGNOSIS — R35.0 URINARY FREQUENCY: ICD-10-CM

## 2020-09-24 DIAGNOSIS — N39.0 URINARY TRACT INFECTION WITHOUT HEMATURIA, SITE UNSPECIFIED: Primary | ICD-10-CM

## 2020-09-24 LAB
SL AMB  POCT GLUCOSE, UA: ABNORMAL
SL AMB LEUKOCYTE ESTERASE,UA: ABNORMAL
SL AMB POCT BILIRUBIN,UA: ABNORMAL
SL AMB POCT BLOOD,UA: ABNORMAL
SL AMB POCT CLARITY,UA: CLEAR
SL AMB POCT COLOR,UA: ABNORMAL
SL AMB POCT KETONES,UA: ABNORMAL
SL AMB POCT NITRITE,UA: POSITIVE
SL AMB POCT PH,UA: 5
SL AMB POCT SPECIFIC GRAVITY,UA: 1.01
SL AMB POCT URINE PROTEIN: ABNORMAL
SL AMB POCT UROBILINOGEN: 0.2

## 2020-09-24 PROCEDURE — 81002 URINALYSIS NONAUTO W/O SCOPE: CPT | Performed by: PHYSICIAN ASSISTANT

## 2020-09-24 PROCEDURE — 99213 OFFICE O/P EST LOW 20 MIN: CPT | Performed by: PHYSICIAN ASSISTANT

## 2020-09-24 PROCEDURE — 87086 URINE CULTURE/COLONY COUNT: CPT | Performed by: PHYSICIAN ASSISTANT

## 2020-09-24 PROCEDURE — G0463 HOSPITAL OUTPT CLINIC VISIT: HCPCS | Performed by: PHYSICIAN ASSISTANT

## 2020-09-24 RX ORDER — CIPROFLOXACIN 500 MG/1
500 TABLET, FILM COATED ORAL EVERY 12 HOURS SCHEDULED
Qty: 14 TABLET | Refills: 0 | Status: SHIPPED | OUTPATIENT
Start: 2020-09-24 | End: 2020-10-01

## 2020-09-24 NOTE — PATIENT INSTRUCTIONS
Start Cipro as prescribed  Drink plenty of water  If symptoms worsen go to the ER for further evaluation  Urinary Tract Infection in Women   AMBULATORY CARE:   A urinary tract infection (UTI)  is caused by bacteria that get inside your urinary tract  Most bacteria that enter your urinary tract come out when you urinate  If the bacteria stay in your urinary tract, you may get an infection  Your urinary tract includes your kidneys, ureters, bladder, and urethra  Urine is made in your kidneys, and it flows from the ureters to the bladder  Urine leaves the bladder through the urethra  A UTI is more common in your lower urinary tract, which includes your bladder and urethra  Common symptoms include the following:   · Urinating more often or waking from sleep to urinate    · Pain or burning when you urinate    · Pain or pressure in your lower abdomen     · Urine that smells bad    · Blood in your urine    · Leaking urine  Seek care immediately if:   · You are urinating very little or not at all  · You have a high fever with shaking chills  · You have side or back pain that gets worse  Contact your healthcare provider if:   · You have a fever  · You do not feel better after 2 days of taking antibiotics  · You are vomiting  · You have questions or concerns about your condition or care  Treatment for a UTI  may include medicines to treat a bacterial infection  You may also need medicines to decrease pain and burning, or decrease the urge to urinate often  Prevent a UTI:   · Empty your bladder often  Urinate and empty your bladder as soon as you feel the need  Do not hold your urine for long periods of time  · Wipe from front to back after you urinate or have a bowel movement  This will help prevent germs from getting into your urinary tract through your urethra  · Drink liquids as directed  Ask how much liquid to drink each day and which liquids are best for you   You may need to drink more liquids than usual to help flush out the bacteria  Do not drink alcohol, caffeine, or citrus juices  These can irritate your bladder and increase your symptoms  Your healthcare provider may recommend cranberry juice to help prevent a UTI  · Urinate after you have sex  This can help flush out bacteria passed during sex  · Do not douche or use feminine deodorants  These can change the chemical balance in your vagina  · Change sanitary pads or tampons often  This will help prevent germs from getting into your urinary tract  · Do pelvic muscle exercises often  Pelvic muscle exercises may help you start and stop urinating  Strong pelvic muscles may help you empty your bladder easier  Squeeze these muscles tightly for 5 seconds like you are trying to hold back urine  Then relax for 5 seconds  Gradually work up to squeezing for 10 seconds  Do 3 sets of 15 repetitions a day, or as directed  Follow up with your healthcare provider as directed:  Write down your questions so you remember to ask them during your visits  © 2017 [facility] Information is for End User's use only and may not be sold, redistributed or otherwise used for commercial purposes  All illustrations and images included in CareNotes® are the copyrighted property of [employer]  or Maninder Atkinson  The above information is an  only  It is not intended as medical advice for individual conditions or treatments  Talk to your doctor, nurse or pharmacist before following any medical regimen to see if it is safe and effective for you

## 2020-09-24 NOTE — PROGRESS NOTES
[facility]        NAME: Ervin Izaguirre is a 82 y o  female  : 1935    MRN: [id number]  DATE: 2020  TIME: 12:28 PM    Assessment and Plan   Urinary tract infection without hematuria, site unspecified [N39 0]  1  Urinary tract infection without hematuria, site unspecified  ciprofloxacin (CIPRO) 500 mg tablet   2  Urinary frequency  POCT urine dip         Patient Instructions     Start Cipro as prescribed  Drink plenty of water  If symptoms worsen go to the ER for further evaluation  Follow up with PCP in 3-5 days  Proceed to  ER if symptoms worsen  Chief Complaint     Chief Complaint   Patient presents with    Possible UTI     c/o fatigue on Tuesday and then yesterday with urinary frequency, has been taking AZO and drinking cranberry juice  Denies pain  History of Present Illness       Patient presents with a 2 day history of fatigue and 1 day history of urinary frequency  Been taking over-the-counter azo drinking cranberry juice without any improvement  Patient denies any flank pain abdominal pain nausea vomiting fever or chills  Last urinary tract infection was 3 months ago  Review of Systems   Review of Systems   Constitutional: Negative for chills and fever  Genitourinary: Positive for frequency  Negative for difficulty urinating, dysuria, hematuria and urgency           Current Medications       Current Outpatient Medications:     Calcium Carbonate (CALTRATE 600) 1500 (600 Ca) MG TABS, 1 tablet daily, Disp: , Rfl:     EPINEPHrine (EPIPEN) 0 3 mg/0 3 mL SOAJ, epinephrine 0 3 mg/0 3 mL injection, auto-injector  INJECT 0 3 MILLILITER BY INTRAMUSCULAR ROUTE ONCE AS NEEDED FOR ANAPHYLAXIS, Disp: , Rfl:     gabapentin (NEURONTIN) 300 mg capsule, prn at night, Disp: , Rfl:     GLUCOSAMINE-CHONDROITIN DS PO, 1 qd, Disp: , Rfl:     levothyroxine 50 mcg tablet, Take 50 mcg by mouth, Disp: , Rfl:     multivitamin (THERAGRAN) TABS, Take 1 tablet by mouth, Disp: , Rfl:     ciprofloxacin (CIPRO) 500 mg tablet, Take 1 tablet (500 mg total) by mouth every 12 (twelve) hours for 7 days, Disp: 14 tablet, Rfl: 0    Current Allergies     Allergies as of 09/24/2020 - Reviewed 09/24/2020   Allergen Reaction Noted    Doxycycline Hives     Naproxen Hives     Wasp venom Hives 09/17/2019            The following portions of the patient's history were reviewed and updated as appropriate: allergies, current medications, past family history, past medical history, past social history, past surgical history and problem list      Past Medical History:   Diagnosis Date    Osteoporosis        Past Surgical History:   Procedure Laterality Date    BACK SURGERY  2017    Lumbar fusion    FINGER SURGERY      SPINE SURGERY         No family history on file  Medications have been verified  Objective   /74 (BP Location: Left arm, Patient Position: Sitting, Cuff Size: Adult)   Pulse 79   Temp 97 7 °F (36 5 °C) (Temporal)   Resp 16   Ht 5' 3" (1 6 m)   Wt 69 3 kg (152 lb 12 8 oz)   SpO2 96%   BMI 27 07 kg/m²        Physical Exam     Physical Exam  Vitals signs and nursing note reviewed  Constitutional:       Appearance: Normal appearance  HENT:      Head: Normocephalic and atraumatic  Cardiovascular:      Rate and Rhythm: Regular rhythm  Pulmonary:      Effort: Pulmonary effort is normal    Skin:     General: Skin is warm  Neurological:      Mental Status: She is alert

## 2020-09-25 LAB — BACTERIA UR CULT: NORMAL

## 2021-05-22 ENCOUNTER — OFFICE VISIT (OUTPATIENT)
Dept: URGENT CARE | Facility: CLINIC | Age: 86
End: 2021-05-22
Payer: MEDICARE

## 2021-05-22 VITALS — OXYGEN SATURATION: 96 % | RESPIRATION RATE: 16 BRPM | HEART RATE: 76 BPM | TEMPERATURE: 97.7 F

## 2021-05-22 DIAGNOSIS — J01.90 ACUTE SINUSITIS, RECURRENCE NOT SPECIFIED, UNSPECIFIED LOCATION: Primary | ICD-10-CM

## 2021-05-22 PROCEDURE — G0463 HOSPITAL OUTPT CLINIC VISIT: HCPCS | Performed by: PHYSICIAN ASSISTANT

## 2021-05-22 PROCEDURE — U0003 INFECTIOUS AGENT DETECTION BY NUCLEIC ACID (DNA OR RNA); SEVERE ACUTE RESPIRATORY SYNDROME CORONAVIRUS 2 (SARS-COV-2) (CORONAVIRUS DISEASE [COVID-19]), AMPLIFIED PROBE TECHNIQUE, MAKING USE OF HIGH THROUGHPUT TECHNOLOGIES AS DESCRIBED BY CMS-2020-01-R: HCPCS | Performed by: PHYSICIAN ASSISTANT

## 2021-05-22 PROCEDURE — U0005 INFEC AGEN DETEC AMPLI PROBE: HCPCS | Performed by: PHYSICIAN ASSISTANT

## 2021-05-22 PROCEDURE — 99213 OFFICE O/P EST LOW 20 MIN: CPT | Performed by: PHYSICIAN ASSISTANT

## 2021-05-22 RX ORDER — AMOXICILLIN 875 MG/1
875 TABLET, COATED ORAL 2 TIMES DAILY
Qty: 14 TABLET | Refills: 0 | Status: SHIPPED | OUTPATIENT
Start: 2021-05-22 | End: 2021-05-29

## 2021-05-22 RX ORDER — FLUTICASONE PROPIONATE 50 MCG
1 SPRAY, SUSPENSION (ML) NASAL DAILY
Qty: 9.9 ML | Refills: 0 | Status: SHIPPED | OUTPATIENT
Start: 2021-05-22

## 2021-05-22 NOTE — PROGRESS NOTES
[facility]        NAME: Abdi Ramsey is a 82 y o  female  : 1935    MRN: [id number]  DATE: May 22, 2021  TIME: 1:31 PM    Assessment and Plan   Acute sinusitis, recurrence not specified, unspecified location [J01 90]  1  Acute sinusitis, recurrence not specified, unspecified location  Novel Coronavirus (Covid-19),PCR [facility] - Office Collection    amoxicillin (AMOXIL) 875 mg tablet    fluticasone (FLONASE) 50 mcg/act nasal spray         Patient Instructions     Patient Instructions     Hydration and rest     Start Flonase and amoxicillin  Nasal saline rinses and sprays  Start over-the-counter Claritin  COVID-19 testing  Home isolation  PCP follow-up  Go to emergency room if worsening symptoms, difficulty breathing or swallowing  **Portions of the record may have been created with voice recognition software  Occasional wrong word or "sound a like" substitutions may have occurred due to the inherent limitations of voice recognition software  Read the chart carefully and recognize, using context, where substitutions have occurred  **     Chief Complaint     Chief Complaint   Patient presents with    Nasal Congestion     Pt c/o nasal congestion since Monday  History of Present Illness       79-year-old female presents to clinic with complaints stuffy runny nose and cough x1 week  She denies fever, loss of taste or smell, nausea vomiting or diarrhea, chest pain or shortness of breath  No known sick contacts  No recent travel  No over-the-counter medications  Review of Systems     Review of Systems   Constitutional: Negative for chills, fatigue and fever  HENT: Positive for congestion, sinus pressure and sore throat  Negative for ear pain  Respiratory: Positive for cough  Negative for chest tightness and shortness of breath  Cardiovascular: Negative for chest pain  Gastrointestinal: Negative for diarrhea, nausea and vomiting     Musculoskeletal: Negative for myalgias  Neurological: Negative for headaches  Current Medications     Current Outpatient Medications:     amoxicillin (AMOXIL) 875 mg tablet, Take 1 tablet (875 mg total) by mouth 2 (two) times a day for 7 days, Disp: 14 tablet, Rfl: 0    Calcium Carbonate (CALTRATE 600) 1500 (600 Ca) MG TABS, 1 tablet daily, Disp: , Rfl:     EPINEPHrine (EPIPEN) 0 3 mg/0 3 mL SOAJ, epinephrine 0 3 mg/0 3 mL injection, auto-injector  INJECT 0 3 MILLILITER BY INTRAMUSCULAR ROUTE ONCE AS NEEDED FOR ANAPHYLAXIS, Disp: , Rfl:     fluticasone (FLONASE) 50 mcg/act nasal spray, 1 spray into each nostril daily, Disp: 9 9 mL, Rfl: 0    gabapentin (NEURONTIN) 300 mg capsule, prn at night, Disp: , Rfl:     GLUCOSAMINE-CHONDROITIN DS PO, 1 qd, Disp: , Rfl:     levothyroxine 50 mcg tablet, Take 50 mcg by mouth, Disp: , Rfl:     multivitamin (THERAGRAN) TABS, Take 1 tablet by mouth, Disp: , Rfl:     Current Allergies     Allergies as of 05/22/2021 - Reviewed 05/22/2021   Allergen Reaction Noted    Doxycycline Hives     Naproxen Hives     Wasp venom Hives 09/17/2019            The following portions of the patient's history were reviewed and updated as appropriate: allergies, current medications, past family history, past medical history, past social history, past surgical history and problem list      Past Medical History:   Diagnosis Date    Osteoporosis        Past Surgical History:   Procedure Laterality Date    BACK SURGERY  2017    Lumbar fusion    FINGER SURGERY      SPINE SURGERY         No family history on file  Medications have been verified  Objective     Pulse 76   Temp 97 7 °F (36 5 °C)   Resp 16   SpO2 96%        Physical Exam     Physical Exam  Vitals signs and nursing note reviewed  Constitutional:       General: She is not in acute distress  Appearance: Normal appearance  HENT:      Head: Normocephalic and atraumatic        Right Ear: Tympanic membrane and ear canal normal       Left Ear: Tympanic membrane and ear canal normal       Nose: Congestion present  No rhinorrhea  Mouth/Throat:      Pharynx: Posterior oropharyngeal erythema present  No oropharyngeal exudate  Cardiovascular:      Rate and Rhythm: Normal rate and regular rhythm  Pulmonary:      Effort: Pulmonary effort is normal       Breath sounds: Normal breath sounds  Skin:     Findings: No rash  Neurological:      Mental Status: She is alert

## 2021-05-22 NOTE — PATIENT INSTRUCTIONS
Hydration and rest     Start Flonase and amoxicillin  Nasal saline rinses and sprays  Start over-the-counter Claritin  COVID-19 testing  Home isolation  PCP follow-up  Go to emergency room if worsening symptoms, difficulty breathing or swallowing

## 2021-05-23 LAB — SARS-COV-2 RNA RESP QL NAA+PROBE: NEGATIVE

## 2021-06-13 DIAGNOSIS — J01.90 ACUTE SINUSITIS, RECURRENCE NOT SPECIFIED, UNSPECIFIED LOCATION: ICD-10-CM

## 2021-06-30 RX ORDER — FLUTICASONE PROPIONATE 50 MCG
SPRAY, SUSPENSION (ML) NASAL
Qty: 16 ML | OUTPATIENT
Start: 2021-06-30

## 2024-08-15 ENCOUNTER — APPOINTMENT (EMERGENCY)
Dept: CT IMAGING | Facility: HOSPITAL | Age: 89
End: 2024-08-15
Payer: MEDICARE

## 2024-08-15 ENCOUNTER — HOSPITAL ENCOUNTER (EMERGENCY)
Facility: HOSPITAL | Age: 89
Discharge: HOME | End: 2024-08-15
Attending: EMERGENCY MEDICINE
Payer: MEDICARE

## 2024-08-15 ENCOUNTER — APPOINTMENT (EMERGENCY)
Dept: RADIOLOGY | Facility: HOSPITAL | Age: 89
End: 2024-08-15
Payer: MEDICARE

## 2024-08-15 VITALS
OXYGEN SATURATION: 95 % | RESPIRATION RATE: 16 BRPM | TEMPERATURE: 98.4 F | WEIGHT: 156.53 LBS | DIASTOLIC BLOOD PRESSURE: 79 MMHG | HEART RATE: 67 BPM | BODY MASS INDEX: 27.73 KG/M2 | SYSTOLIC BLOOD PRESSURE: 167 MMHG

## 2024-08-15 DIAGNOSIS — S52.502A DISTAL RADIUS FRACTURE, LEFT: Primary | ICD-10-CM
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
## 2024-08-15 DIAGNOSIS — S00.81XA ABRASION OF FACE, INITIAL ENCOUNTER: ICD-10-CM

## 2024-08-15 LAB
ABO GROUP BLD: NORMAL
ABO GROUP BLD: NORMAL
ANION GAP SERPL CALCULATED.3IONS-SCNC: 9 MMOL/L (ref 4–13)
BASOPHILS # BLD AUTO: 0.03 THOUSANDS/ÂΜL (ref 0–0.1)
BASOPHILS NFR BLD AUTO: 0 % (ref 0–1)
BLD GP AB SCN SERPL QL: NEGATIVE
BUN SERPL-MCNC: 19 MG/DL (ref 5–25)
CALCIUM SERPL-MCNC: 9.3 MG/DL (ref 8.4–10.2)
CHLORIDE SERPL-SCNC: 105 MMOL/L (ref 96–108)
CO2 SERPL-SCNC: 24 MMOL/L (ref 21–32)
CREAT SERPL-MCNC: 0.64 MG/DL (ref 0.6–1.3)
EOSINOPHIL # BLD AUTO: 0.2 THOUSAND/ÂΜL (ref 0–0.61)
EOSINOPHIL NFR BLD AUTO: 3 % (ref 0–6)
ERYTHROCYTE [DISTWIDTH] IN BLOOD BY AUTOMATED COUNT: 13.1 % (ref 11.6–15.1)
GFR SERPL CREATININE-BSD FRML MDRD: 80 ML/MIN/1.73SQ M
GLUCOSE SERPL-MCNC: 116 MG/DL (ref 65–140)
HCT VFR BLD AUTO: 41.2 % (ref 34.8–46.1)
HGB BLD-MCNC: 13.8 G/DL (ref 11.5–15.4)
IMM GRANULOCYTES # BLD AUTO: 0.01 THOUSAND/UL (ref 0–0.2)
IMM GRANULOCYTES NFR BLD AUTO: 0 % (ref 0–2)
LYMPHOCYTES # BLD AUTO: 1.56 THOUSANDS/ÂΜL (ref 0.6–4.47)
LYMPHOCYTES NFR BLD AUTO: 20 % (ref 14–44)
MCH RBC QN AUTO: 30.4 PG (ref 26.8–34.3)
MCHC RBC AUTO-ENTMCNC: 33.5 G/DL (ref 31.4–37.4)
MCV RBC AUTO: 91 FL (ref 82–98)
MONOCYTES # BLD AUTO: 0.68 THOUSAND/ÂΜL (ref 0.17–1.22)
MONOCYTES NFR BLD AUTO: 9 % (ref 4–12)
NEUTROPHILS # BLD AUTO: 5.2 THOUSANDS/ÂΜL (ref 1.85–7.62)
NEUTS SEG NFR BLD AUTO: 68 % (ref 43–75)
NRBC BLD AUTO-RTO: 0 /100 WBCS
PLATELET # BLD AUTO: 243 THOUSANDS/UL (ref 149–390)
PMV BLD AUTO: 9.5 FL (ref 8.9–12.7)
POTASSIUM SERPL-SCNC: 3.6 MMOL/L (ref 3.5–5.3)
RBC # BLD AUTO: 4.54 MILLION/UL (ref 3.81–5.12)
RH BLD: POSITIVE
RH BLD: POSITIVE
SODIUM SERPL-SCNC: 138 MMOL/L (ref 135–147)
SPECIMEN EXPIRATION DATE: NORMAL
WBC # BLD AUTO: 7.68 THOUSAND/UL (ref 4.31–10.16)

## 2024-08-15 PROCEDURE — 72125 CT NECK SPINE W/O DYE: CPT

## 2024-08-15 PROCEDURE — 86901 BLOOD TYPING SEROLOGIC RH(D): CPT | Performed by: EMERGENCY MEDICINE

## 2024-08-15 PROCEDURE — 86850 RBC ANTIBODY SCREEN: CPT | Performed by: EMERGENCY MEDICINE

## 2024-08-15 PROCEDURE — 85025 COMPLETE CBC W/AUTO DIFF WBC: CPT | Performed by: EMERGENCY MEDICINE

## 2024-08-15 PROCEDURE — 86900 BLOOD TYPING SEROLOGIC ABO: CPT | Performed by: EMERGENCY MEDICINE

## 2024-08-15 PROCEDURE — 99285 EMERGENCY DEPT VISIT HI MDM: CPT | Performed by: EMERGENCY MEDICINE

## 2024-08-15 PROCEDURE — 80048 BASIC METABOLIC PNL TOTAL CA: CPT | Performed by: EMERGENCY MEDICINE

## 2024-08-15 PROCEDURE — 73110 X-RAY EXAM OF WRIST: CPT

## 2024-08-15 PROCEDURE — 99284 EMERGENCY DEPT VISIT MOD MDM: CPT

## 2024-08-15 PROCEDURE — 36415 COLL VENOUS BLD VENIPUNCTURE: CPT | Performed by: EMERGENCY MEDICINE

## 2024-08-15 PROCEDURE — 70450 CT HEAD/BRAIN W/O DYE: CPT

## 2024-08-15 NOTE — ED PROVIDER NOTES
History  Chief Complaint   Patient presents with    Fall     [place] the woods and slipped.patient fell hitting her forehead on a tree. No loc. No blood thinners.  Denies HA/ dizziness       Patient states it was a mechanical fall.  She did not lose consciousness.  States she is not on any blood thinners or aspirin.  States her pain is primarily in her left wrist.  Her friend saw the abrasion to the patient's forehead and nasal bridge and became concerned.  States when she fell she landed on both of her outstretched hands.  She has been up and ambulating since then.  She still able to move her wrists.  No new weakness or numbness.  No episodes of vomiting or dizziness.  Her friend states she is acting at her mental baseline.        Prior to Admission Medications   Prescriptions Last Dose Informant Patient Reported? Taking?   Calcium Carbonate 1500 (600 Ca) MG TABS  Self Yes No   Si tablet daily   EPINEPHrine (EPIPEN) 0.3 mg/0.3 mL SOAJ  Self Yes No   Sig: epinephrine 0.3 mg/0.3 mL injection, auto-injector   INJECT 0.3 MILLILITER BY INTRAMUSCULAR ROUTE ONCE AS NEEDED FOR ANAPHYLAXIS   GLUCOSAMINE-CHONDROITIN DS PO  Self Yes No   Si qd   fluticasone (FLONASE) 50 mcg/act nasal spray  Self No No   Si spray into each nostril daily   gabapentin (NEURONTIN) 300 mg capsule  Self Yes No   Sig: prn at night   levothyroxine 50 mcg tablet  Self Yes No   Sig: Take 50 mcg by mouth   multivitamin (THERAGRAN) TABS  Self Yes No   Sig: Take 1 tablet by mouth   omeprazole (PriLOSEC) 40 MG capsule  Self Yes No   Sig: Take 40 mg by mouth daily      Facility-Administered Medications: None       Past Medical History:   Diagnosis Date    Osteoporosis        Past Surgical History:   Procedure Laterality Date    BACK SURGERY  2017    Lumbar fusion    FINGER SURGERY      SPINE SURGERY         History reviewed. No pertinent family history.  I have reviewed and agree with the history as documented.    E-Cigarette/Vaping     E-Cigarette Use Never User      E-Cigarette/Vaping Substances     Social History     Tobacco Use    Smoking status: Never    Smokeless tobacco: Never   Vaping Use    Vaping status: Never Used   Substance Use Topics    Alcohol use: Yes     Comment: 1 glass of wine with dinner (not everyday)    Drug use: No       Review of Systems   Constitutional:  Negative for activity change, chills, fatigue and fever.   HENT:  Negative for congestion.    Eyes:  Negative for visual disturbance.   Respiratory:  Negative for cough, chest tightness and shortness of breath.    Cardiovascular:  Negative for chest pain.   Gastrointestinal:  Negative for abdominal pain, diarrhea and vomiting.   Genitourinary:  Negative for dysuria.   Skin:  Negative for rash.   Neurological:  Negative for dizziness, weakness and numbness.       Physical Exam  Physical Exam  Constitutional:       General: She is not in acute distress.     Appearance: She is well-developed. She is not ill-appearing, toxic-appearing or diaphoretic.   HENT:      Head:      Comments: Abrasion and hematoma over the nasal bridge extending into the inferior forehead  Eyes:      Conjunctiva/sclera: Conjunctivae normal.      Pupils: Pupils are equal, round, and reactive to light.   Cardiovascular:      Rate and Rhythm: Normal rate and regular rhythm.      Heart sounds: Normal heart sounds.   Pulmonary:      Effort: Pulmonary effort is normal. No respiratory distress.      Breath sounds: Normal breath sounds.   Abdominal:      General: Bowel sounds are normal.      Palpations: Abdomen is soft.   Musculoskeletal:         General: Swelling, tenderness and signs of injury present. Deformity: over left wrist.Normal range of motion.      Cervical back: Normal range of motion and neck supple.   Skin:     General: Skin is warm and dry.      Capillary Refill: Capillary refill takes less than 2 seconds.   Neurological:      Mental Status: She is alert and oriented to person, place, and time.    Psychiatric:         Behavior: Behavior normal.         Vital Signs  ED Triage Vitals   Temperature Pulse Respirations Blood Pressure SpO2   08/15/24 1709 08/15/24 1709 08/15/24 1709 08/15/24 1714 08/15/24 1709   98.4 °F (36.9 °C) 76 16 150/68 95 %      Temp Source Heart Rate Source Patient Position - Orthostatic VS BP Location FiO2 (%)   08/15/24 1709 08/15/24 1709 08/15/24 1709 08/15/24 1709 --   Tympanic Monitor Sitting Left arm       Pain Score       --                  Vitals:    08/15/24 1715 08/15/24 1745 08/15/24 1815 08/15/24 1830   BP: 150/68 145/72 167/85 167/79   Pulse: 71 67 71 67   Patient Position - Orthostatic VS:             Visual Acuity  Visual Acuity      Flowsheet Row Most Recent Value   L Pupil Size (mm) 3   R Pupil Size (mm) 3            ED Medications  Medications - No data to display    Diagnostic Studies  Results Reviewed       Procedure Component Value Units Date/Time    Basic metabolic panel [[id number]] Collected: 08/15/24 1724    Lab Status: Final result Specimen: Blood from Arm, Right Updated: 08/15/24 1746     Sodium 138 mmol/L      Potassium 3.6 mmol/L      Chloride 105 mmol/L      CO2 24 mmol/L      ANION GAP 9 mmol/L      BUN 19 mg/dL      Creatinine 0.64 mg/dL      Glucose 116 mg/dL      Calcium 9.3 mg/dL      eGFR 80 ml/min/1.73sq m     Narrative:      [employer] guidelines for Chronic Kidney Disease (CKD):     Stage 1 with normal or high GFR (GFR > 90 mL/min/1.73 square meters)    Stage 2 Mild CKD (GFR = 60-89 mL/min/1.73 square meters)    Stage 3A Moderate CKD (GFR = 45-59 mL/min/1.73 square meters)    Stage 3B Moderate CKD (GFR = 30-44 mL/min/1.73 square meters)    Stage 4 Severe CKD (GFR = 15-29 mL/min/1.73 square meters)    Stage 5 End Stage CKD (GFR <15 mL/min/1.73 square meters)  Note: GFR calculation is accurate only with a steady state creatinine    CBC and differential [[id number]] Collected: 08/15/24 1724    Lab Status: Final result Specimen:  Blood from Arm, Right Updated: 08/15/24 1730     WBC 7.68 Thousand/uL      RBC 4.54 Million/uL      Hemoglobin 13.8 g/dL      Hematocrit 41.2 %      MCV 91 fL      MCH 30.4 pg      MCHC 33.5 g/dL      RDW 13.1 %      MPV 9.5 fL      Platelets 243 Thousands/uL      nRBC 0 /100 WBCs      Segmented % 68 %      Immature Grans % 0 %      Lymphocytes % 20 %      Monocytes % 9 %      Eosinophils Relative 3 %      Basophils Relative 0 %      Absolute Neutrophils 5.20 Thousands/µL      Absolute Immature Grans 0.01 Thousand/uL      Absolute Lymphocytes 1.56 Thousands/µL      Absolute Monocytes 0.68 Thousand/µL      Eosinophils Absolute 0.20 Thousand/µL      Basophils Absolute 0.03 Thousands/µL                    XR wrist 3+ views LEFT   Final Result by Nasima Parmar MD (08/15 1806)   No acute osseous abnormality.            Computerized Assisted Algorithm (CAA) may have been used to analyze all applicable images.            Workstation performed: [id number]         XR wrist 3+ views RIGHT   Final Result by Nasima Parmar MD (08/15 1805)   No acute osseous abnormality.            Computerized Assisted Algorithm (CAA) may have been used to analyze all applicable images.            Workstation performed: [id number]         TRAUMA - CT head wo contrast   Final Result by Nasima Parmar MD (08/15 1755)      No acute findings.      Nonemergent findings above.         Workstation performed: [id number]         TRAUMA - CT spine cervical wo contrast   Final Result by Nasima Parmar MD (08/15 1803)      No cervical spine fracture or traumatic malalignment.               Workstation performed: [id number]                    Procedures  Procedures         ED Course                                 SBIRT 20yo+      Flowsheet Row Most Recent Value   Initial Alcohol Screen: US AUDIT-C     1. How often do you have a drink containing alcohol? 6 Filed at: 08/15/2024 5377   2. How many drinks containing alcohol do you have on a typical day you are  drinking?  0 Filed at: 08/15/2024 1708   3a. Male UNDER 65: How often do you have five or more drinks on one occasion? 0 Filed at: 08/15/2024 1708   3b. FEMALE Any Age, or MALE 65+: How often do you have 4 or more drinks on one occassion? 0 Filed at: 08/15/2024 1708   Audit-C Score 6 Filed at: 08/15/2024 1708   ELAYNE: How many times in the past year have you...    Used an illegal drug or used a prescription medication for non-medical reasons? Never Filed at: 08/15/2024 1708                      Medical Decision Making  89-year-old female status post fall with left distal radial fracture.  Given thumb spica splint, static on the left.  No other injuries requiring treatment identified.  Patient up-to-date on tetanus.  Wound on forehead cleaned and dressed.  Trauma Secondary exam performed (must be performed and documented on all traumas): GCS 15, full ROM of bilateral upper and lower extremities. Airway intact, bilateral breath sounds, palpable pulses. No active bleeding. No bony point tenderness in chest, abdomen or c/t,l spine. No crepitus, abdomen soft/non tender. Chest wall soft non tender with no deformities. Pelvis stable.   Document C-spine clearance after CT c-spine came back normal - cspineclearsmartlist: Of note, C-spine clear after CT scans resulted.    Discussed warning signs and symptoms with the patient as well as when to return to the emergency department versus follow up with [facility]. Patient states understanding and agreement with the plan..  This note was completed using dictation software.       Problems Addressed:  Abrasion of face, initial encounter: acute illness or injury  Distal radius fracture, left: acute illness or injury  Fall, initial encounter: acute illness or injury    Amount and/or Complexity of Data Reviewed  Independent Historian: friend  Labs: ordered.  Radiology: ordered and independent interpretation performed.    Risk  OTC drugs.  Prescription drug management.                  Disposition  Final diagnoses:   Distal radius fracture, left   Fall, initial encounter   Abrasion of face, initial encounter     Time reflects when diagnosis was documented in both MDM as applicable and the Disposition within this note       Time User Action Codes Description Comment    8/15/2024  6:08 PM Ezra Song [S52.502A] Distal radius fracture, left     8/15/2024  6:08 PM Ezra Song [W19.XXXA] Fall, initial encounter     8/15/2024  6:08 PM Ezra Song [S00.81XA] Abrasion of face, initial encounter           ED Disposition       ED Disposition   Discharge    Condition   Stable    Date/Time   Thu Aug 15, 2024 1808    Comment   Anaya Lyle discharge to home/self care.                   Follow-up Information       Follow up With Specialties Details Why Contact Info    [facility]   [address]  [facility] [address]  [phone]      Justice Ott, DO Orthopedic Surgery In 5 days  [address]  [address]  [facility] [address] [phone]              Patient's Medications   Discharge Prescriptions    No medications on file           PDMP Review       None            ED Provider  Electronically Signed by             Ezra Song MD  08/15/24 7158